# Patient Record
Sex: FEMALE | Race: WHITE | URBAN - METROPOLITAN AREA
[De-identification: names, ages, dates, MRNs, and addresses within clinical notes are randomized per-mention and may not be internally consistent; named-entity substitution may affect disease eponyms.]

---

## 2021-12-13 ENCOUNTER — OFFICE VISIT (OUTPATIENT)
Dept: FAMILY MEDICINE CLINIC | Facility: CLINIC | Age: 46
End: 2021-12-13
Payer: COMMERCIAL

## 2021-12-13 VITALS
SYSTOLIC BLOOD PRESSURE: 152 MMHG | BODY MASS INDEX: 23.63 KG/M2 | WEIGHT: 147 LBS | TEMPERATURE: 98 F | HEIGHT: 66 IN | DIASTOLIC BLOOD PRESSURE: 89 MMHG | HEART RATE: 86 BPM

## 2021-12-13 DIAGNOSIS — Z12.31 ENCOUNTER FOR SCREENING MAMMOGRAM FOR MALIGNANT NEOPLASM OF BREAST: ICD-10-CM

## 2021-12-13 DIAGNOSIS — E61.1 IRON DEFICIENCY: ICD-10-CM

## 2021-12-13 DIAGNOSIS — N80.1 ENDOMETRIOMA OF OVARY: ICD-10-CM

## 2021-12-13 DIAGNOSIS — Z00.00 ENCOUNTER FOR ANNUAL HEALTH EXAMINATION: Primary | ICD-10-CM

## 2021-12-13 DIAGNOSIS — R03.0 ELEVATED BLOOD PRESSURE READING: ICD-10-CM

## 2021-12-13 PROCEDURE — 3077F SYST BP >= 140 MM HG: CPT | Performed by: FAMILY MEDICINE

## 2021-12-13 PROCEDURE — 99386 PREV VISIT NEW AGE 40-64: CPT | Performed by: FAMILY MEDICINE

## 2021-12-13 PROCEDURE — 3079F DIAST BP 80-89 MM HG: CPT | Performed by: FAMILY MEDICINE

## 2021-12-13 PROCEDURE — 3008F BODY MASS INDEX DOCD: CPT | Performed by: FAMILY MEDICINE

## 2021-12-13 NOTE — PROGRESS NOTES
HPI:   Diedra Kayser is a 39year old female who presents for a complete physical exam.    Work: Works in a warehouse  Social: Lives with her son   Diet: eats home cooked meals   Exercise: Sedentary  GYN history: , one miscarriage  Periods are: s (Approximate)   BMI 23.73 kg/m²     GENERAL: well developed, well nourished, in no apparent distress  SKIN: no rashes, no suspicious lesions  HEENT: atraumatic, normocephalic, ears are clear  EYES: PERRLA, EOMI,conjunctiva are clear  NECK: supple, no adeno

## 2022-01-07 ENCOUNTER — TELEPHONE (OUTPATIENT)
Dept: OBGYN CLINIC | Facility: CLINIC | Age: 47
End: 2022-01-07

## 2022-01-07 ENCOUNTER — OFFICE VISIT (OUTPATIENT)
Dept: OBGYN CLINIC | Facility: CLINIC | Age: 47
End: 2022-01-07
Payer: COMMERCIAL

## 2022-01-07 VITALS — DIASTOLIC BLOOD PRESSURE: 62 MMHG | SYSTOLIC BLOOD PRESSURE: 122 MMHG | WEIGHT: 147 LBS | BODY MASS INDEX: 24 KG/M2

## 2022-01-07 DIAGNOSIS — N92.6 IRREGULAR PERIODS: ICD-10-CM

## 2022-01-07 DIAGNOSIS — Z01.419 WELL WOMAN EXAM WITH ROUTINE GYNECOLOGICAL EXAM: Primary | ICD-10-CM

## 2022-01-07 DIAGNOSIS — Z11.3 SCREENING EXAMINATION FOR STD (SEXUALLY TRANSMITTED DISEASE): ICD-10-CM

## 2022-01-07 PROCEDURE — 3074F SYST BP LT 130 MM HG: CPT | Performed by: OBSTETRICS & GYNECOLOGY

## 2022-01-07 PROCEDURE — 99386 PREV VISIT NEW AGE 40-64: CPT | Performed by: OBSTETRICS & GYNECOLOGY

## 2022-01-07 PROCEDURE — 3078F DIAST BP <80 MM HG: CPT | Performed by: OBSTETRICS & GYNECOLOGY

## 2022-01-07 NOTE — PROGRESS NOTES
HPI:    Patient ID: Brooke Yoo is a 55year old year old female.     HPI  New patient  Well woman visit  States that her last woman's exam was over 5 years ago which includes her last Pap test and mammogram.  She has a history of endometriosis and tenderness. There is no rebound and no CVA tenderness. No hernia. Hernia negative in the ventral area,  negative in the right inguinal area and negative in the left inguinal area.      Genitourinary:   Pelvic exam was performed with patient supine and chape CASCADE,         CHLAMYDIA/GONOCOCCUS, IRIS, HIV AG AB COMBO              Orders Placed This Encounter      [facility]          Standing Status: Future          Standing Expiration Date: 1/7/2023          Order Specific Question: Release to patient          Answer

## 2022-01-10 ENCOUNTER — TELEPHONE (OUTPATIENT)
Dept: FAMILY MEDICINE CLINIC | Facility: CLINIC | Age: 47
End: 2022-01-10

## 2022-01-10 DIAGNOSIS — D50.9 MICROCYTIC ANEMIA: Primary | ICD-10-CM

## 2022-01-10 PROBLEM — E61.1 IRON DEFICIENCY: Status: ACTIVE | Noted: 2022-01-10

## 2022-01-10 LAB
C TRACH DNA SPEC QL NAA+PROBE: NEGATIVE
HPV I/H RISK 1 DNA SPEC QL NAA+PROBE: NEGATIVE
N GONORRHOEA DNA SPEC QL NAA+PROBE: NEGATIVE

## 2022-01-10 RX ORDER — FERROUS SULFATE TAB EC 324 MG (65 MG FE EQUIVALENT) 324 (65 FE) MG
1 TABLET DELAYED RESPONSE ORAL DAILY
Qty: 90 TABLET | Refills: 0 | Status: SHIPPED | OUTPATIENT
Start: 2022-01-10 | End: 2022-02-09

## 2022-01-14 ENCOUNTER — TELEPHONE (OUTPATIENT)
Dept: OBGYN CLINIC | Facility: CLINIC | Age: 47
End: 2022-01-14

## 2022-01-14 NOTE — TELEPHONE ENCOUNTER
[employer] SYSTEM[employer] message sent to pt.      ----- Message from Shaquille Christensen MD sent at 1/14/2022 10:39 AM CST -----  Please notify by MyChart or letter of normal Pap test and normal HPV cotesting.

## 2022-01-17 ENCOUNTER — TELEPHONE (OUTPATIENT)
Dept: OBGYN CLINIC | Facility: CLINIC | Age: 47
End: 2022-01-17

## 2022-01-17 NOTE — TELEPHONE ENCOUNTER
Please inform patient of her blood tests which show that she is not menopausal and has normal thyroid hormone screening.   Screening for HIV, hepatitis B and C were normal.

## 2022-02-15 ENCOUNTER — TELEPHONE (OUTPATIENT)
Dept: OBGYN CLINIC | Facility: CLINIC | Age: 47
End: 2022-02-15

## 2022-02-18 ENCOUNTER — TELEPHONE (OUTPATIENT)
Dept: OBGYN CLINIC | Facility: CLINIC | Age: 47
End: 2022-02-18

## 2022-02-18 NOTE — TELEPHONE ENCOUNTER
Patient states she received a message to let her know she had some pending lab work. Patient would like to speak to a nurse stating she thinks she did all the lab work necessary. Patient is a Kyrgyz speaker.

## 2022-02-18 NOTE — TELEPHONE ENCOUNTER
Called [facility] and they will be faxing all the test results pt had done on 1/8/22 to office. Staff to look for fax.

## 2022-02-18 NOTE — TELEPHONE ENCOUNTER
Pt informed that she needs to complete Exams for [facility] that are still in the system. Pt thought she had completed them already. This RN checked [facility] Plascencia and nothing seen in the sytem. Pt will go to [facility] to ask if her labs were completed if not, she will get them done when she goes. No further questions.

## 2022-02-18 NOTE — TELEPHONE ENCOUNTER
Pt said labs were done at [address] [place] [address] Jan 8. [facility] said they sent results to us  Belizean speaking

## 2022-02-24 ENCOUNTER — TELEPHONE (OUTPATIENT)
Dept: OBGYN CLINIC | Facility: CLINIC | Age: 47
End: 2022-02-24

## 2022-02-24 NOTE — TELEPHONE ENCOUNTER
Please inform of the blood test results that were faxed from [facility]. Results show normal thyroid screen and FSH. This indicates that she is not menopausal as yet. Screening for STDs including HIV, hepatitis B and C were negative.

## 2022-02-24 NOTE — TELEPHONE ENCOUNTER
Pt Name and  verified. Patient informed and verbalized understanding. Pt states that she did receive a bill but was unsure what it was for and she said she will be calling back with that information.

## 2022-03-02 ENCOUNTER — TELEPHONE (OUTPATIENT)
Dept: OBGYN CLINIC | Facility: CLINIC | Age: 47
End: 2022-03-02

## 2022-03-02 NOTE — TELEPHONE ENCOUNTER
Patient called in she stated Dr Belkis Griffith office called her two days ago,   She wasn't able to answer she stated she is now able to return the call . .. She is available after 2:30 every day . Stephanie Swan ...  NEEDS INTEPRETER

## 2022-03-03 NOTE — TELEPHONE ENCOUNTER
Left detailed message informing her that office staff here at [facility] did not contact her today. Advised that it may have possibly been an automated call as she does have an apt scheduled for mammogram on 3/9/22. Pt to call back if she has any questions/concerns.

## 2022-03-09 ENCOUNTER — HOSPITAL ENCOUNTER (OUTPATIENT)
Dept: MAMMOGRAPHY | Facility: HOSPITAL | Age: 47
Discharge: HOME | End: 2022-03-09
Attending: FAMILY MEDICINE
Payer: COMMERCIAL

## 2022-03-09 DIAGNOSIS — Z12.31 ENCOUNTER FOR SCREENING MAMMOGRAM FOR MALIGNANT NEOPLASM OF BREAST: ICD-10-CM

## 2022-03-09 PROCEDURE — 77063 BREAST TOMOSYNTHESIS BI: CPT | Performed by: FAMILY MEDICINE

## 2022-03-09 PROCEDURE — 77067 SCR MAMMO BI INCL CAD: CPT | Performed by: FAMILY MEDICINE

## 2022-03-15 ENCOUNTER — HOSPITAL ENCOUNTER (OUTPATIENT)
Dept: MAMMOGRAPHY | Facility: HOSPITAL | Age: 47
Discharge: HOME | End: 2022-03-15
Attending: FAMILY MEDICINE
Payer: COMMERCIAL

## 2022-03-15 DIAGNOSIS — R92.8 ABNORMAL MAMMOGRAM: ICD-10-CM

## 2022-03-15 PROCEDURE — 77061 BREAST TOMOSYNTHESIS UNI: CPT | Performed by: FAMILY MEDICINE

## 2022-03-15 PROCEDURE — 77065 DX MAMMO INCL CAD UNI: CPT | Performed by: FAMILY MEDICINE

## 2023-03-02 ENCOUNTER — OFFICE VISIT (OUTPATIENT)
Dept: FAMILY MEDICINE CLINIC | Facility: CLINIC | Age: 48
End: 2023-03-02

## 2023-03-02 VITALS
WEIGHT: 159 LBS | BODY MASS INDEX: 26 KG/M2 | DIASTOLIC BLOOD PRESSURE: 103 MMHG | HEART RATE: 76 BPM | TEMPERATURE: 99 F | SYSTOLIC BLOOD PRESSURE: 165 MMHG

## 2023-03-02 DIAGNOSIS — Z12.11 COLON CANCER SCREENING: ICD-10-CM

## 2023-03-02 DIAGNOSIS — Z00.00 ENCOUNTER FOR ANNUAL HEALTH EXAMINATION: Primary | ICD-10-CM

## 2023-03-02 DIAGNOSIS — D22.9 CHANGE IN MOLE: ICD-10-CM

## 2023-03-02 DIAGNOSIS — T78.40XA ALLERGY, INITIAL ENCOUNTER: ICD-10-CM

## 2023-03-02 DIAGNOSIS — Z12.31 ENCOUNTER FOR SCREENING MAMMOGRAM FOR MALIGNANT NEOPLASM OF BREAST: ICD-10-CM

## 2023-03-02 DIAGNOSIS — I10 ESSENTIAL HYPERTENSION: ICD-10-CM

## 2023-03-02 DIAGNOSIS — Z12.4 CERVICAL CANCER SCREENING: ICD-10-CM

## 2023-03-02 PROCEDURE — 99396 PREV VISIT EST AGE 40-64: CPT | Performed by: FAMILY MEDICINE

## 2023-03-02 PROCEDURE — 3077F SYST BP >= 140 MM HG: CPT | Performed by: FAMILY MEDICINE

## 2023-03-02 PROCEDURE — 3080F DIAST BP >= 90 MM HG: CPT | Performed by: FAMILY MEDICINE

## 2023-03-02 RX ORDER — TELMISARTAN 40 MG/1
40 TABLET ORAL DAILY
Qty: 90 TABLET | Refills: 1 | Status: SHIPPED | OUTPATIENT
Start: 2023-03-02

## 2023-03-08 ENCOUNTER — PATIENT MESSAGE (OUTPATIENT)
Dept: FAMILY MEDICINE CLINIC | Facility: CLINIC | Age: 48
End: 2023-03-08

## 2023-03-09 ENCOUNTER — HOSPITAL ENCOUNTER (OUTPATIENT)
Age: 48
Discharge: HOME | End: 2023-03-09
Attending: EMERGENCY MEDICINE
Payer: COMMERCIAL

## 2023-03-09 ENCOUNTER — APPOINTMENT (OUTPATIENT)
Dept: GENERAL RADIOLOGY | Age: 48
End: 2023-03-09
Attending: EMERGENCY MEDICINE
Payer: COMMERCIAL

## 2023-03-09 ENCOUNTER — APPOINTMENT (OUTPATIENT)
Dept: CT IMAGING | Age: 48
End: 2023-03-09
Attending: EMERGENCY MEDICINE
Payer: COMMERCIAL

## 2023-03-09 VITALS
HEART RATE: 105 BPM | DIASTOLIC BLOOD PRESSURE: 95 MMHG | RESPIRATION RATE: 20 BRPM | SYSTOLIC BLOOD PRESSURE: 172 MMHG | OXYGEN SATURATION: 95 % | TEMPERATURE: 99 F

## 2023-03-09 DIAGNOSIS — J18.9 PNEUMONIA DUE TO INFECTIOUS ORGANISM, UNSPECIFIED LATERALITY, UNSPECIFIED PART OF LUNG: ICD-10-CM

## 2023-03-09 DIAGNOSIS — R03.0 ELEVATED BLOOD PRESSURE READING: ICD-10-CM

## 2023-03-09 DIAGNOSIS — R06.2 WHEEZING: Primary | ICD-10-CM

## 2023-03-09 DIAGNOSIS — R91.1 LUNG NODULE: ICD-10-CM

## 2023-03-09 LAB
#MXD IC: 0.9 X10ˆ3/UL (ref 0.1–1)
ATRIAL RATE: 100 BPM
BUN BLD-MCNC: 5 MG/DL (ref 7–18)
CHLORIDE BLD-SCNC: 104 MMOL/L (ref 98–112)
CO2 BLD-SCNC: 24 MMOL/L (ref 21–32)
CREAT BLD-MCNC: 0.6 MG/DL
DDIMER WHOLE BLOOD: 1560 NG/ML DDU (ref ?–400)
GFR SERPLBLD BASED ON 1.73 SQ M-ARVRAT: 111 ML/MIN/1.73M2 (ref 60–?)
GLUCOSE BLD-MCNC: 120 MG/DL (ref 70–99)
HCT VFR BLD AUTO: 39.4 %
HCT VFR BLD CALC: 41 %
HGB BLD-MCNC: 12.8 G/DL
ISTAT IONIZED CALCIUM FOR CHEM 8: 1.12 MMOL/L (ref 1.12–1.32)
LYMPHOCYTES # BLD AUTO: 1.5 X10ˆ3/UL (ref 1–4)
LYMPHOCYTES NFR BLD AUTO: 14.8 %
MCH RBC QN AUTO: 26.6 PG (ref 26–34)
MCHC RBC AUTO-ENTMCNC: 32.5 G/DL (ref 31–37)
MCV RBC AUTO: 81.7 FL (ref 80–100)
MIXED CELL %: 8.7 %
NEUTROPHILS # BLD AUTO: 8 X10ˆ3/UL (ref 1.5–7.7)
NEUTROPHILS NFR BLD AUTO: 76.5 %
P AXIS: 11 DEGREES
P-R INTERVAL: 120 MS
PLATELET # BLD AUTO: 204 X10ˆ3/UL (ref 150–450)
POCT INFLUENZA A: NEGATIVE
POCT INFLUENZA B: NEGATIVE
POTASSIUM BLD-SCNC: 3.3 MMOL/L (ref 3.6–5.1)
Q-T INTERVAL: 342 MS
QRS DURATION: 84 MS
QTC CALCULATION (BEZET): 441 MS
R AXIS: 74 DEGREES
RBC # BLD AUTO: 4.82 X10ˆ6/UL
SARS-COV-2 RNA RESP QL NAA+PROBE: NOT DETECTED
SODIUM BLD-SCNC: 140 MMOL/L (ref 136–145)
T AXIS: 62 DEGREES
TROPONIN I BLD-MCNC: <0.02 NG/ML
TROPONIN I BLD-MCNC: <0.02 NG/ML
VENTRICULAR RATE: 100 BPM
WBC # BLD AUTO: 10.4 X10ˆ3/UL (ref 4–11)

## 2023-03-09 PROCEDURE — 84484 ASSAY OF TROPONIN QUANT: CPT

## 2023-03-09 PROCEDURE — 71260 CT THORAX DX C+: CPT | Performed by: EMERGENCY MEDICINE

## 2023-03-09 PROCEDURE — 93010 ELECTROCARDIOGRAM REPORT: CPT

## 2023-03-09 PROCEDURE — 99215 OFFICE O/P EST HI 40 MIN: CPT

## 2023-03-09 PROCEDURE — 36415 COLL VENOUS BLD VENIPUNCTURE: CPT

## 2023-03-09 PROCEDURE — 85378 FIBRIN DEGRADE SEMIQUANT: CPT | Performed by: EMERGENCY MEDICINE

## 2023-03-09 PROCEDURE — 85025 COMPLETE CBC W/AUTO DIFF WBC: CPT | Performed by: EMERGENCY MEDICINE

## 2023-03-09 PROCEDURE — 87502 INFLUENZA DNA AMP PROBE: CPT | Performed by: EMERGENCY MEDICINE

## 2023-03-09 PROCEDURE — 80047 BASIC METABLC PNL IONIZED CA: CPT

## 2023-03-09 PROCEDURE — 99205 OFFICE O/P NEW HI 60 MIN: CPT

## 2023-03-09 PROCEDURE — 94640 AIRWAY INHALATION TREATMENT: CPT

## 2023-03-09 PROCEDURE — 93005 ELECTROCARDIOGRAM TRACING: CPT

## 2023-03-09 PROCEDURE — 71046 X-RAY EXAM CHEST 2 VIEWS: CPT | Performed by: EMERGENCY MEDICINE

## 2023-03-09 RX ORDER — DOXYCYCLINE HYCLATE 100 MG/1
100 CAPSULE ORAL 2 TIMES DAILY
Qty: 14 CAPSULE | Refills: 0 | Status: SHIPPED | OUTPATIENT
Start: 2023-03-09 | End: 2023-03-16

## 2023-03-09 RX ORDER — ACETAMINOPHEN 500 MG
1000 TABLET ORAL ONCE
Status: COMPLETED | OUTPATIENT
Start: 2023-03-09 | End: 2023-03-09

## 2023-03-09 RX ORDER — ALBUTEROL SULFATE 2.5 MG/3ML
2.5 SOLUTION RESPIRATORY (INHALATION) ONCE
Status: COMPLETED | OUTPATIENT
Start: 2023-03-09 | End: 2023-03-09

## 2023-03-09 RX ORDER — AMLODIPINE BESYLATE 5 MG/1
5 TABLET ORAL DAILY
Qty: 30 TABLET | Refills: 0 | Status: SHIPPED | OUTPATIENT
Start: 2023-03-09 | End: 2023-03-09

## 2023-03-09 RX ORDER — POTASSIUM CHLORIDE 20 MEQ/1
40 TABLET, EXTENDED RELEASE ORAL ONCE
Status: COMPLETED | OUTPATIENT
Start: 2023-03-09 | End: 2023-03-09

## 2023-03-09 RX ORDER — ALBUTEROL SULFATE 90 UG/1
2 AEROSOL, METERED RESPIRATORY (INHALATION) EVERY 4 HOURS PRN
Qty: 1 EACH | Refills: 0 | Status: SHIPPED | OUTPATIENT
Start: 2023-03-09 | End: 2023-03-09

## 2023-03-09 RX ORDER — ALBUTEROL SULFATE 90 UG/1
2 AEROSOL, METERED RESPIRATORY (INHALATION) EVERY 4 HOURS PRN
Qty: 1 EACH | Refills: 0 | Status: SHIPPED | OUTPATIENT
Start: 2023-03-09 | End: 2023-04-08

## 2023-03-09 RX ORDER — DOXYCYCLINE HYCLATE 100 MG/1
100 CAPSULE ORAL 2 TIMES DAILY
Qty: 14 CAPSULE | Refills: 0 | Status: SHIPPED | OUTPATIENT
Start: 2023-03-09 | End: 2023-03-09

## 2023-03-09 RX ORDER — AMLODIPINE BESYLATE 5 MG/1
5 TABLET ORAL DAILY
Qty: 30 TABLET | Refills: 0 | Status: SHIPPED | OUTPATIENT
Start: 2023-03-09 | End: 2023-03-17

## 2023-03-09 NOTE — DISCHARGE INSTRUCTIONS
Thank you for visiting our immediate care for your health care needs. Please follow up with your regular doctor in the next 1-2 days. If you have any additional problems please return to the immediate care. Please take all medications as prescribed.

## 2023-03-10 LAB
ATRIAL RATE: 89 BPM
P AXIS: 33 DEGREES
P-R INTERVAL: 130 MS
Q-T INTERVAL: 372 MS
QRS DURATION: 86 MS
QTC CALCULATION (BEZET): 452 MS
R AXIS: 69 DEGREES
T AXIS: 70 DEGREES
VENTRICULAR RATE: 89 BPM

## 2023-03-14 ENCOUNTER — LAB ENCOUNTER (OUTPATIENT)
Dept: LAB | Age: 48
End: 2023-03-14
Attending: FAMILY MEDICINE
Payer: COMMERCIAL

## 2023-03-14 LAB
ALBUMIN SERPL-MCNC: 3.8 G/DL (ref 3.4–5)
ALBUMIN/GLOB SERPL: 1 {RATIO} (ref 1–2)
ALP LIVER SERPL-CCNC: 102 U/L
ALT SERPL-CCNC: 20 U/L
ANION GAP SERPL CALC-SCNC: 8 MMOL/L (ref 0–18)
AST SERPL-CCNC: 11 U/L (ref 15–37)
BASOPHILS # BLD AUTO: 0.04 X10(3) UL (ref 0–0.2)
BASOPHILS NFR BLD AUTO: 0.5 %
BILIRUB SERPL-MCNC: 0.5 MG/DL (ref 0.1–2)
BUN BLD-MCNC: 10 MG/DL (ref 7–18)
BUN/CREAT SERPL: 13.2 (ref 10–20)
CALCIUM BLD-MCNC: 8.6 MG/DL (ref 8.5–10.1)
CHLORIDE SERPL-SCNC: 108 MMOL/L (ref 98–112)
CHOLEST SERPL-MCNC: 148 MG/DL (ref ?–200)
CO2 SERPL-SCNC: 26 MMOL/L (ref 21–32)
CREAT BLD-MCNC: 0.76 MG/DL
DEPRECATED RDW RBC AUTO: 43.8 FL (ref 35.1–46.3)
EOSINOPHIL # BLD AUTO: 0.42 X10(3) UL (ref 0–0.7)
EOSINOPHIL NFR BLD AUTO: 5.5 %
ERYTHROCYTE [DISTWIDTH] IN BLOOD BY AUTOMATED COUNT: 14.6 % (ref 11–15)
EST. AVERAGE GLUCOSE BLD GHB EST-MCNC: 123 MG/DL (ref 68–126)
FASTING PATIENT LIPID ANSWER: YES
FASTING STATUS PATIENT QL REPORTED: YES
GFR SERPLBLD BASED ON 1.73 SQ M-ARVRAT: 97 ML/MIN/1.73M2 (ref 60–?)
GLOBULIN PLAS-MCNC: 4 G/DL (ref 2.8–4.4)
GLUCOSE BLD-MCNC: 119 MG/DL (ref 70–99)
HBA1C MFR BLD: 5.9 % (ref ?–5.7)
HCT VFR BLD AUTO: 41.2 %
HDLC SERPL-MCNC: 54 MG/DL (ref 40–59)
HGB BLD-MCNC: 13.3 G/DL
IMM GRANULOCYTES # BLD AUTO: 0.01 X10(3) UL (ref 0–1)
IMM GRANULOCYTES NFR BLD: 0.1 %
LDLC SERPL CALC-MCNC: 76 MG/DL (ref ?–100)
LYMPHOCYTES # BLD AUTO: 2.75 X10(3) UL (ref 1–4)
LYMPHOCYTES NFR BLD AUTO: 36.3 %
MCH RBC QN AUTO: 26.7 PG (ref 26–34)
MCHC RBC AUTO-ENTMCNC: 32.3 G/DL (ref 31–37)
MCV RBC AUTO: 82.7 FL
MONOCYTES # BLD AUTO: 0.64 X10(3) UL (ref 0.1–1)
MONOCYTES NFR BLD AUTO: 8.5 %
NEUTROPHILS # BLD AUTO: 3.71 X10 (3) UL (ref 1.5–7.7)
NEUTROPHILS # BLD AUTO: 3.71 X10(3) UL (ref 1.5–7.7)
NEUTROPHILS NFR BLD AUTO: 49.1 %
NONHDLC SERPL-MCNC: 94 MG/DL (ref ?–130)
OSMOLALITY SERPL CALC.SUM OF ELEC: 294 MOSM/KG (ref 275–295)
PLATELET # BLD AUTO: 256 10(3)UL (ref 150–450)
POTASSIUM SERPL-SCNC: 3.5 MMOL/L (ref 3.5–5.1)
PROT SERPL-MCNC: 7.8 G/DL (ref 6.4–8.2)
RBC # BLD AUTO: 4.98 X10(6)UL
SODIUM SERPL-SCNC: 142 MMOL/L (ref 136–145)
TRIGL SERPL-MCNC: 97 MG/DL (ref 30–149)
TSI SER-ACNC: 3.72 MIU/ML (ref 0.36–3.74)
VLDLC SERPL CALC-MCNC: 15 MG/DL (ref 0–30)
WBC # BLD AUTO: 7.6 X10(3) UL (ref 4–11)

## 2023-03-14 PROCEDURE — 80061 LIPID PANEL: CPT | Performed by: FAMILY MEDICINE

## 2023-03-14 PROCEDURE — 36415 COLL VENOUS BLD VENIPUNCTURE: CPT | Performed by: FAMILY MEDICINE

## 2023-03-14 PROCEDURE — 84443 ASSAY THYROID STIM HORMONE: CPT | Performed by: FAMILY MEDICINE

## 2023-03-14 PROCEDURE — 85025 COMPLETE CBC W/AUTO DIFF WBC: CPT | Performed by: FAMILY MEDICINE

## 2023-03-14 PROCEDURE — 82785 ASSAY OF IGE: CPT | Performed by: FAMILY MEDICINE

## 2023-03-14 PROCEDURE — 83036 HEMOGLOBIN GLYCOSYLATED A1C: CPT | Performed by: FAMILY MEDICINE

## 2023-03-14 PROCEDURE — 86003 ALLG SPEC IGE CRUDE XTRC EA: CPT | Performed by: FAMILY MEDICINE

## 2023-03-14 PROCEDURE — 80053 COMPREHEN METABOLIC PANEL: CPT | Performed by: FAMILY MEDICINE

## 2023-03-16 LAB
A ALTERNATA IGE QN: <0.1 KUA/L (ref ?–0.1)
A FUMIGATUS IGE QN: <0.1 KUA/L (ref ?–0.1)
AMER SYCAMORE IGE QN: <0.1 KUA/L (ref ?–0.1)
BERMUDA GRASS IGE QN: <0.1 KUA/L (ref ?–0.1)
BOXELDER IGE QN: <0.1 KUA/L (ref ?–0.1)
C HERBARUM IGE QN: <0.1 KUA/L (ref ?–0.1)
CALIF WALNUT IGE QN: <0.1 KUA/L (ref ?–0.1)
CAT DANDER IGE QN: 44.7 KUA/L (ref ?–0.1)
CMN PIGWEED IGE QN: <0.1 KUA/L (ref ?–0.1)
COMMON RAGWEED IGE QN: <0.1 KUA/L (ref ?–0.1)
COTTONWOOD IGE QN: <0.1 KUA/L (ref ?–0.1)
D FARINAE IGE QN: 0.42 KUA/L (ref ?–0.1)
D PTERONYSS IGE QN: 0.46 KUA/L (ref ?–0.1)
DOG DANDER IGE QN: 2.58 KUA/L (ref ?–0.1)
IGE SERPL-ACNC: 237 KU/L (ref 2–214)
M RACEMOSUS IGE QN: <0.1 KUA/L (ref ?–0.1)
MARSH ELDER IGE QN: <0.1 KUA/L (ref ?–0.1)
MOUSE EPITH IGE QN: <0.1 KUA/L (ref ?–0.1)
MT JUNIPER IGE QN: <0.1 KUA/L (ref ?–0.1)
P NOTATUM IGE QN: <0.1 KUA/L (ref ?–0.1)
PECAN/HICK TREE IGE QN: <0.1 KUA/L (ref ?–0.1)
ROACH IGE QN: <0.1 KUA/L (ref ?–0.1)
SALTWORT IGE QN: <0.1 KUA/L (ref ?–0.1)
TIMOTHY IGE QN: <0.1 KUA/L (ref ?–0.1)
WHITE ASH IGE QN: <0.1 KUA/L (ref ?–0.1)
WHITE ELM IGE QN: <0.1 KUA/L (ref ?–0.1)
WHITE MULBERRY IGE QN: <0.1 KUA/L (ref ?–0.1)
WHITE OAK IGE QN: <0.1 KUA/L (ref ?–0.1)

## 2023-03-17 ENCOUNTER — PATIENT MESSAGE (OUTPATIENT)
Dept: FAMILY MEDICINE CLINIC | Facility: CLINIC | Age: 48
End: 2023-03-17

## 2023-03-17 ENCOUNTER — TELEMEDICINE (OUTPATIENT)
Dept: FAMILY MEDICINE CLINIC | Facility: CLINIC | Age: 48
End: 2023-03-17

## 2023-03-17 DIAGNOSIS — J30.81 CAT ALLERGIES: ICD-10-CM

## 2023-03-17 DIAGNOSIS — Z12.4 CERVICAL CANCER SCREENING: ICD-10-CM

## 2023-03-17 DIAGNOSIS — R93.89 ABNORMAL CT OF THE CHEST: Primary | ICD-10-CM

## 2023-03-17 DIAGNOSIS — R91.8 GROUND GLASS OPACITY PRESENT ON IMAGING OF LUNG: ICD-10-CM

## 2023-03-17 DIAGNOSIS — I10 ESSENTIAL HYPERTENSION: ICD-10-CM

## 2023-03-17 DIAGNOSIS — R91.1 PULMONARY NODULE: ICD-10-CM

## 2023-03-17 DIAGNOSIS — R73.03 PREDIABETES: ICD-10-CM

## 2023-03-17 PROCEDURE — 99214 OFFICE O/P EST MOD 30 MIN: CPT | Performed by: FAMILY MEDICINE

## 2023-03-17 RX ORDER — AMLODIPINE BESYLATE 5 MG/1
5 TABLET ORAL DAILY
Qty: 90 TABLET | Refills: 3 | Status: SHIPPED | OUTPATIENT
Start: 2023-03-17

## 2023-03-17 RX ORDER — TELMISARTAN 40 MG/1
40 TABLET ORAL DAILY
Qty: 90 TABLET | Refills: 1 | Status: SHIPPED | OUTPATIENT
Start: 2023-03-17

## 2023-03-17 NOTE — PROGRESS NOTES
Virtual Telephone Check-In    Sofia Smith verbally consents to a Virtual/Telephone Check-In service on 03/17/23. Patient understands and accepts financial responsibility for any deductible, co-insurance and/or co-pays associated with this service. Duration of the service: 15 minutes  This telemedicine visit was conducted using live audio and video. Summary of topics discussed:     Lab work follow up. Newly discovered pre-dm. HTN - taking amlodipine rx'ed in ER last week but not checking BP at home. Was not able to  ARB rxed in office. Was seen in ER for SOB, given albuterol which helped. CXR and CT PE done - 1 cm nodule seen. Pt has a history of endometrioma - wants to est care with OBG. Has allergies to cat/dog dander and needs f/u with allergist. Has 2 cats at home. Physical Exam:  General: alert, speaking in full sentences, no acute distress  Lungs: respirations sound unlabored, no audible wheezing with speaking. Neurologic: alert, oriented x3    Assessment and plan:    1. Essential hypertension  - pt had a hard time picking up telmisartan needs pharmacy transfer. Amlodipine strated in ER. Start checking BP at home and send ua s aBP log next week. Can start w just amlodipine and will add on telmisartan as appropriate. - telmisartan 40 MG Oral Tab; Take 1 tablet (40 mg total) by mouth daily. Dispense: 90 tablet; Refill: 1  - amLODIPine 5 MG Oral Tab; Take 1 tablet (5 mg total) by mouth daily. Dispense: 90 tablet; Refill: 3    2. Prediabetes  - new diagnosis. Diet and l/s changes     3. Cat allergies  - has cats, allergy testing reviewed   - ALLERGY - INTERNAL    4. Pulmonary nodule  nonemergent PET-CT is suggested - Will defer to next OV.   - CT CHEST (CPT=71250); Future    5. Ground glass opacity present on imaging of lung  - CT PE reviewed, recommend re-test in 3 months. - PULMONARY - INTERNAL    6.  Abnormal CT of the chest  - PULMONARY - INTERNAL  - CT CHEST (CPT=71250); Future    7. Cervical cancer screening  - pt requesting blank referral, can update referral prn with specialist name if needed. - OBG - INTERNAL      Advised to call back clinic if no improvement in symptoms. Red flags discussed to go to ER.      Swetha Fisher MD

## 2023-03-23 ENCOUNTER — OFFICE VISIT (OUTPATIENT)
Dept: DERMATOLOGY CLINIC | Facility: CLINIC | Age: 48
End: 2023-03-23

## 2023-03-23 DIAGNOSIS — D22.9 MULTIPLE BENIGN NEVI: Primary | ICD-10-CM

## 2023-03-23 PROCEDURE — 99203 OFFICE O/P NEW LOW 30 MIN: CPT | Performed by: STUDENT IN AN ORGANIZED HEALTH CARE EDUCATION/TRAINING PROGRAM

## 2023-03-29 RX ORDER — AMLODIPINE BESYLATE 5 MG/1
5 TABLET ORAL DAILY
Qty: 90 TABLET | Refills: 3 | Status: SHIPPED | OUTPATIENT
Start: 2023-03-29

## 2023-03-31 ENCOUNTER — HOSPITAL ENCOUNTER (OUTPATIENT)
Dept: MAMMOGRAPHY | Age: 48
Discharge: HOME | End: 2023-03-31
Attending: FAMILY MEDICINE
Payer: COMMERCIAL

## 2023-03-31 DIAGNOSIS — Z12.31 ENCOUNTER FOR SCREENING MAMMOGRAM FOR MALIGNANT NEOPLASM OF BREAST: ICD-10-CM

## 2023-03-31 PROCEDURE — 77067 SCR MAMMO BI INCL CAD: CPT | Performed by: FAMILY MEDICINE

## 2023-03-31 PROCEDURE — 77063 BREAST TOMOSYNTHESIS BI: CPT | Performed by: FAMILY MEDICINE

## 2023-05-02 ENCOUNTER — NURSE ONLY (OUTPATIENT)
Dept: ALLERGY | Facility: CLINIC | Age: 48
End: 2023-05-02

## 2023-05-02 ENCOUNTER — OFFICE VISIT (OUTPATIENT)
Dept: ALLERGY | Facility: CLINIC | Age: 48
End: 2023-05-02

## 2023-05-02 VITALS
TEMPERATURE: 99 F | SYSTOLIC BLOOD PRESSURE: 129 MMHG | OXYGEN SATURATION: 99 % | HEART RATE: 99 BPM | DIASTOLIC BLOOD PRESSURE: 87 MMHG | RESPIRATION RATE: 20 BRPM

## 2023-05-02 DIAGNOSIS — Z92.29 COVID-19 VACCINE SERIES COMPLETED: ICD-10-CM

## 2023-05-02 DIAGNOSIS — J30.2 SEASONAL AND PERENNIAL ALLERGIC RHINOCONJUNCTIVITIS: ICD-10-CM

## 2023-05-02 DIAGNOSIS — H10.10 SEASONAL AND PERENNIAL ALLERGIC RHINOCONJUNCTIVITIS: Primary | ICD-10-CM

## 2023-05-02 DIAGNOSIS — Z23 FLU VACCINE NEED: ICD-10-CM

## 2023-05-02 DIAGNOSIS — J45.20 MILD INTERMITTENT REACTIVE AIRWAY DISEASE WITHOUT COMPLICATION: ICD-10-CM

## 2023-05-02 DIAGNOSIS — J30.2 SEASONAL AND PERENNIAL ALLERGIC RHINOCONJUNCTIVITIS: Primary | ICD-10-CM

## 2023-05-02 DIAGNOSIS — J30.89 SEASONAL AND PERENNIAL ALLERGIC RHINOCONJUNCTIVITIS: ICD-10-CM

## 2023-05-02 DIAGNOSIS — H10.10 SEASONAL AND PERENNIAL ALLERGIC RHINOCONJUNCTIVITIS: ICD-10-CM

## 2023-05-02 DIAGNOSIS — J30.89 SEASONAL AND PERENNIAL ALLERGIC RHINOCONJUNCTIVITIS: Primary | ICD-10-CM

## 2023-05-02 PROCEDURE — 3074F SYST BP LT 130 MM HG: CPT | Performed by: ALLERGY & IMMUNOLOGY

## 2023-05-02 PROCEDURE — 3079F DIAST BP 80-89 MM HG: CPT | Performed by: ALLERGY & IMMUNOLOGY

## 2023-05-02 PROCEDURE — 99244 OFF/OP CNSLTJ NEW/EST MOD 40: CPT | Performed by: ALLERGY & IMMUNOLOGY

## 2023-05-02 PROCEDURE — 3008F BODY MASS INDEX DOCD: CPT | Performed by: ALLERGY & IMMUNOLOGY

## 2023-05-02 PROCEDURE — 95004 PERQ TESTS W/ALRGNC XTRCS: CPT | Performed by: ALLERGY & IMMUNOLOGY

## 2023-05-02 RX ORDER — LORATADINE 10 MG/1
10 TABLET ORAL DAILY
COMMUNITY
End: 2023-05-02

## 2023-05-02 RX ORDER — LEVOCETIRIZINE DIHYDROCHLORIDE 5 MG/1
5 TABLET, FILM COATED ORAL EVERY EVENING
Qty: 90 TABLET | Refills: 1 | Status: SHIPPED | OUTPATIENT
Start: 2023-05-02

## 2023-05-02 RX ORDER — MONTELUKAST SODIUM 10 MG/1
10 TABLET ORAL NIGHTLY
Qty: 90 TABLET | Refills: 1 | Status: SHIPPED | OUTPATIENT
Start: 2023-05-02

## 2023-05-02 RX ORDER — AZELASTINE HYDROCHLORIDE 0.5 MG/ML
1 SOLUTION/ DROPS OPHTHALMIC 2 TIMES DAILY
Qty: 1 EACH | Refills: 0 | Status: SHIPPED | OUTPATIENT
Start: 2023-05-02

## 2023-05-02 NOTE — PATIENT INSTRUCTIONS
1 allergic rhinitis  Reviewed avoidance measures and potential treatment option of immunotherapy  Recommend trial of Xyzal, levocetirizine 5 g once a day as a nonsedating antihistamine for symptoms of runny nose sneezing itchy watery eyes   Nasacort 2 sprays per nostril once a day if having prominent nasal congestion postnasal drip  Optivar eyedrops 1 drop twice a day as an antihistamine eyedrop. Please store infiltrated for cooling effect  Start Singulair 10 mg once a day    #2 COVID vaccines  Recommend booster    #3 flu vaccine in the fall    #4 reactive airway disease  Suspect allergic component. Current albuterol usage is more than 2 days/week. Start trial of Singulair, montelukast 10 mg once a day  Reviewed goals of treatment her asthma symptoms or albuterol usage 2 days/week or less  Will consider trial of inhaled corticosteroid if not improving with above recommendations                 Orders This Visit:  No orders of the defined types were placed in this encounter. Meds This Visit:  Requested Prescriptions     Signed Prescriptions Disp Refills    levocetirizine 5 MG Oral Tab 90 tablet 1     Sig: Take 1 tablet (5 mg total) by mouth every evening.    montelukast (SINGULAIR) 10 MG Oral Tab 90 tablet 1     Sig: Take 1 tablet (10 mg total) by mouth nightly. Azelastine HCl 0.05 % Ophthalmic Solution 1 each 0     Sig: Place 1 drop into both eyes 2 (two) times daily.

## 2023-05-25 RX ORDER — AZELASTINE HYDROCHLORIDE 0.5 MG/ML
SOLUTION/ DROPS OPHTHALMIC
Qty: 6 ML | Refills: 0 | Status: SHIPPED | OUTPATIENT
Start: 2023-05-25

## 2023-06-06 ENCOUNTER — OFFICE VISIT (OUTPATIENT)
Dept: OBGYN CLINIC | Facility: CLINIC | Age: 48
End: 2023-06-06

## 2023-06-06 VITALS — WEIGHT: 160.63 LBS | BODY MASS INDEX: 26 KG/M2 | DIASTOLIC BLOOD PRESSURE: 86 MMHG | SYSTOLIC BLOOD PRESSURE: 124 MMHG

## 2023-06-06 DIAGNOSIS — Z87.42 HISTORY OF ENDOMETRIOSIS: ICD-10-CM

## 2023-06-06 DIAGNOSIS — R10.2 PELVIC PAIN: ICD-10-CM

## 2023-06-06 DIAGNOSIS — Z01.419 ENCOUNTER FOR GYNECOLOGICAL EXAMINATION WITHOUT ABNORMAL FINDING: Primary | ICD-10-CM

## 2023-06-06 PROCEDURE — 99396 PREV VISIT EST AGE 40-64: CPT | Performed by: OBSTETRICS & GYNECOLOGY

## 2023-06-06 PROCEDURE — 3074F SYST BP LT 130 MM HG: CPT | Performed by: OBSTETRICS & GYNECOLOGY

## 2023-06-06 PROCEDURE — 3079F DIAST BP 80-89 MM HG: CPT | Performed by: OBSTETRICS & GYNECOLOGY

## 2023-06-06 PROCEDURE — 99213 OFFICE O/P EST LOW 20 MIN: CPT | Performed by: OBSTETRICS & GYNECOLOGY

## 2023-06-07 LAB — HPV I/H RISK 1 DNA SPEC QL NAA+PROBE: NEGATIVE

## 2023-06-28 RX ORDER — AZELASTINE HYDROCHLORIDE 0.5 MG/ML
SOLUTION/ DROPS OPHTHALMIC
Qty: 1 EACH | Refills: 2 | Status: SHIPPED | OUTPATIENT
Start: 2023-06-28

## 2023-06-30 ENCOUNTER — OFFICE VISIT (OUTPATIENT)
Dept: PULMONOLOGY | Facility: CLINIC | Age: 48
End: 2023-06-30

## 2023-06-30 VITALS
WEIGHT: 162 LBS | DIASTOLIC BLOOD PRESSURE: 79 MMHG | OXYGEN SATURATION: 99 % | BODY MASS INDEX: 26 KG/M2 | HEART RATE: 81 BPM | RESPIRATION RATE: 14 BRPM | SYSTOLIC BLOOD PRESSURE: 121 MMHG

## 2023-06-30 DIAGNOSIS — R91.8 LUNG NODULES: ICD-10-CM

## 2023-06-30 DIAGNOSIS — R05.3 CHRONIC COUGH: Primary | ICD-10-CM

## 2023-06-30 PROCEDURE — 3074F SYST BP LT 130 MM HG: CPT | Performed by: INTERNAL MEDICINE

## 2023-06-30 PROCEDURE — 3078F DIAST BP <80 MM HG: CPT | Performed by: INTERNAL MEDICINE

## 2023-06-30 PROCEDURE — 99244 OFF/OP CNSLTJ NEW/EST MOD 40: CPT | Performed by: INTERNAL MEDICINE

## 2023-06-30 RX ORDER — ALBUTEROL SULFATE 90 UG/1
2 AEROSOL, METERED RESPIRATORY (INHALATION) EVERY 6 HOURS PRN
Qty: 1 EACH | Refills: 3 | Status: SHIPPED | OUTPATIENT
Start: 2023-06-30

## 2023-08-01 ENCOUNTER — PATIENT MESSAGE (OUTPATIENT)
Dept: FAMILY MEDICINE CLINIC | Facility: CLINIC | Age: 48
End: 2023-08-01

## 2023-08-01 ENCOUNTER — OFFICE VISIT (OUTPATIENT)
Dept: SURGERY | Facility: CLINIC | Age: 48
End: 2023-08-01

## 2023-08-01 ENCOUNTER — TELEPHONE (OUTPATIENT)
Dept: OPHTHALMOLOGY | Facility: CLINIC | Age: 48
End: 2023-08-01

## 2023-08-01 VITALS — WEIGHT: 162 LBS | BODY MASS INDEX: 26 KG/M2

## 2023-08-01 DIAGNOSIS — N39.3 STRESS INCONTINENCE: Primary | ICD-10-CM

## 2023-08-01 LAB
BILIRUB UR QL: NEGATIVE
CLARITY UR: CLEAR
GLUCOSE UR-MCNC: NORMAL MG/DL
KETONES UR-MCNC: NEGATIVE MG/DL
LEUKOCYTE ESTERASE UR QL STRIP.AUTO: NEGATIVE
NITRITE UR QL STRIP.AUTO: NEGATIVE
PH UR: 6 [PH] (ref 5–8)
PROT UR-MCNC: NEGATIVE MG/DL
SP GR UR STRIP: 1.02 (ref 1–1.03)
UROBILINOGEN UR STRIP-ACNC: NORMAL

## 2023-08-01 PROCEDURE — 99204 OFFICE O/P NEW MOD 45 MIN: CPT | Performed by: NURSE PRACTITIONER

## 2023-08-01 NOTE — TELEPHONE ENCOUNTER
I spoke to patient at the . She complains of tearing and itching in both eyes for 6 months. I advised patient to use warm compresses 2 times a day and artificial tears 4-5 times a day. Appointment scheduled with Dr. Kristin Holland on 8/08/23 for an office visit.

## 2023-08-01 NOTE — TELEPHONE ENCOUNTER
Pt came to [facility] desk with watering eyes, left eye red approx 6 mo.

## 2023-08-01 NOTE — PROGRESS NOTES
Subjective:     Patient ID: Mireille Mendoza is a 52year old female. HPI    Patient is a 52year old female who presents to the clinic for an initial visit regarding urinary incontinence. Past medical history of hypertension    Patient presents today for an evaluation regarding stress incontinence. Problem started about 1 year ago. She leaks urine with coughing and sneezing. She denies any urge related incontinence. However she does report some urinary frequency and occasional urgency. She does not wear a pad for incontinence. She has not tried anything for the symptoms. She did recently see her gynecologist who noted some incontinence with valsalva on exam.  He did not see any vaginal prolapse. She denies any dysuria or gross hematuria    She has one child via c- section    She reports a history of a UTI in the past  No family history of urological malignancy    She reports a history of smoking, she quit 15+ years ago. No significant alcohol use. History/Other:   Review of Systems  Pertinent positives and negative per ANDRES. A 10 point ROS was performed and is otherwise negative. Current Outpatient Medications   Medication Sig Dispense Refill    Azelastine HCl 0.05 % Ophthalmic Solution       albuterol 108 (90 Base) MCG/ACT Inhalation Aero Soln Inhale 2 puffs into the lungs every 6 (six) hours as needed for Wheezing. 1 each 3    levocetirizine 5 MG Oral Tab Take 1 tablet (5 mg total) by mouth every evening. (Patient not taking: Reported on 6/6/2023) 90 tablet 1    montelukast (SINGULAIR) 10 MG Oral Tab Take 1 tablet (10 mg total) by mouth nightly. 90 tablet 1    amLODIPine 5 MG Oral Tab Take 1 tablet (5 mg total) by mouth daily.  90 tablet 3     Allergies:  Blood-Group Specifi*    FACE FLUSHING, SHORTNESS OF BREATH,                           TONGUE SWELLING  Dust                    Coughing, ITCHING, WHEEZING    Past Medical History:   Diagnosis Date    Essential hypertension       Past Surgical History:   Procedure Laterality Date      2006    OTHER SURGICAL HISTORY  2017    endometrium    OTHER SURGICAL HISTORY      breast augmentation    OTHER SURGICAL HISTORY  2018    nose    OTHER SURGICAL HISTORY  , 2015    ovary sx      Family History   Problem Relation Age of Onset    Hypertension Mother     Hypertension Maternal Grandfather       Social History:   Social History     Socioeconomic History    Marital status: Single   Tobacco Use    Smoking status: Former     Types: Cigarettes     Quit date: 2006     Years since quittin.6     Passive exposure: Never    Smokeless tobacco: Never   Vaping Use    Vaping Use: Never used   Substance and Sexual Activity    Alcohol use: Yes    Drug use: Never   Other Topics Concern    Reaction to local anesthetic No    Pt has a pacemaker No    Pt has a defibrillator No        Objective:   Physical Exam  HENT:      Head: Normocephalic. Eyes:      Conjunctiva/sclera: Conjunctivae normal.   Pulmonary:      Effort: Pulmonary effort is normal.   Abdominal:      General: There is no distension. Palpations: Abdomen is soft. Tenderness: There is no abdominal tenderness. There is no right CVA tenderness or left CVA tenderness. Musculoskeletal:         General: Normal range of motion. Cervical back: Normal range of motion. Skin:     General: Skin is warm and dry. Neurological:      Mental Status: She is alert and oriented to person, place, and time. Psychiatric:         Mood and Affect: Mood normal.         Behavior: Behavior normal.         Thought Content: Thought content normal.         Judgment: Judgment normal.         Assessment & Plan:   No diagnosis found. Patient is a 52year old female who presents to the clinic for an initial visit regarding stress incontinence. Discussed kegel exercises, she states she has tried these over the last year or so however experiences vaginal pain.       Recommended pelvic floor rehab.  Discussed the role of therapy in improving her symptoms. She is agreeable. Referral will be provided. Follow up after completion of therapy. All questions answered. No orders of the defined types were placed in this encounter.       Meds This Visit:  Requested Prescriptions      No prescriptions requested or ordered in this encounter       Imaging & Referrals:  None

## 2023-08-02 ENCOUNTER — NURSE TRIAGE (OUTPATIENT)
Dept: FAMILY MEDICINE CLINIC | Facility: CLINIC | Age: 48
End: 2023-08-02

## 2023-08-02 DIAGNOSIS — H57.9 ITCHY, WATERY, AND RED EYE: Primary | ICD-10-CM

## 2023-08-02 NOTE — TELEPHONE ENCOUNTER
Action Requested: Summary for Provider     []  Critical Lab, Recommendations Needed  [] Need Additional Advice  []   FYI    [x]   Need Orders  [] Need Medications Sent to Pharmacy  []  Other     SUMMARY: Per protocol: OV. Offered an appointment. Patient is requesting a referral for Dr. Hansel Vanegas. She has an appointment with him on 8/8/23. Referral pending your review and approval.     Future Appointments   Date Time Provider Lan Cardenas   8/8/2023  1:30 PM Neal Mcnamara MD [address] [facility]   9/18/2023  3:30 PM Edna Ramirez MD [facility]       Reason for call: Eye Problem  Onset: Data Unavailable    With language line  Fermin Kaiser  ID # [id number] patient states that she has redness and itchiness to both of her eyes. She states that she thought it was allergies but the drops did not help. She denies pain but her eyes do tear up.     Reason for Disposition   Patient wants to be seen    Protocols used: Eye - Red Without Pus-A-OH

## 2023-08-04 DIAGNOSIS — R31.29 MICROHEMATURIA: Primary | ICD-10-CM

## 2023-08-08 ENCOUNTER — OFFICE VISIT (OUTPATIENT)
Dept: OPHTHALMOLOGY | Facility: CLINIC | Age: 48
End: 2023-08-08

## 2023-08-08 DIAGNOSIS — H04.203 BILATERAL EPIPHORA: Primary | ICD-10-CM

## 2023-08-08 PROCEDURE — 99203 OFFICE O/P NEW LOW 30 MIN: CPT | Performed by: OPHTHALMOLOGY

## 2023-08-08 NOTE — PATIENT INSTRUCTIONS
Bilateral epiphora  Diagnosis discussed with patient. Advised patient to stop Tobramycin. Recommend Lacri-lube ointment OU. Advised patient to see Dr. Taiwo Johnson or Dr. Dorathy Ormond at [facility] to evaluate and treat. Information given to patient.       Will see patient as needed

## 2023-08-08 NOTE — PROGRESS NOTES
Katharina Silvestre is a 52year old female. HPI:     HPI    Pt here for an office visit. Pt's last eye exam was 4+ years ago (pt does not remember). Pt complains of tearing and itching in both eyes for about 1 year. Pt saw Dr. Angel Andrade for allergies on 23 and was prescribed Azelastine BID in both eyes. Pt used the drops for 1 week and stopped because they were very drying and caused more itching. Pt has used several other allergy drops and artificial tears including [place] and Clear Eyes but pt has not found any relief from them. Pt is currently using Tobramycin martín once at night and pt has found some relief. Pt complains of blurry distance and near vision. Pt thinks it is due to excessive tearing. Pt states that 2 weeks ago she had a subconjunctival hemorrhage in the left eye. Pt used artificial tears TID and symptoms resolved. Pt has had LASIK done in both eyes in  in [facility]. Consult: per Dr. Rowe Pa  Last edited by Vee Mckeon O.T. on 2023  2:19 PM.        Patient History:  Past Medical History:   Diagnosis Date    Essential hypertension        Surgical History: Katharina Silvestre has a past surgical history that includes  (); other surgical history () (endometrium); other surgical history () (breast augmentation); other surgical history (2018) (nose); other surgical history (, ) (ovary sx); and LASIK (Bilateral, ) (done in [facility]).     Family History   Problem Relation Age of Onset    Hypertension Mother     Hypertension Maternal Grandfather     Macular degeneration Neg     Diabetes Neg     Glaucoma Neg        Social History:   Social History     Socioeconomic History    Marital status: Single   Tobacco Use    Smoking status: Former     Types: Cigarettes     Quit date: 2006     Years since quittin.6     Passive exposure: Never    Smokeless tobacco: Never   Vaping Use    Vaping Use: Never used   Substance and Sexual Activity Alcohol use: Yes    Drug use: Never   Other Topics Concern    Reaction to local anesthetic No    Pt has a pacemaker No    Pt has a defibrillator No       Medications:  Current Outpatient Medications   Medication Sig Dispense Refill    Azelastine HCl 0.05 % Ophthalmic Solution       albuterol 108 (90 Base) MCG/ACT Inhalation Aero Soln Inhale 2 puffs into the lungs every 6 (six) hours as needed for Wheezing. 1 each 3    montelukast (SINGULAIR) 10 MG Oral Tab Take 1 tablet (10 mg total) by mouth nightly. 90 tablet 1    amLODIPine 5 MG Oral Tab Take 1 tablet (5 mg total) by mouth daily. 90 tablet 3    levocetirizine 5 MG Oral Tab Take 1 tablet (5 mg total) by mouth every evening.  (Patient not taking: Reported on 6/6/2023) 90 tablet 1       Allergies:    Blood-Group Specifi*    FACE FLUSHING, SHORTNESS OF BREATH,                           TONGUE SWELLING  Dust                    Coughing, ITCHING, WHEEZING    ROS:     ROS    Positive for: Eyes  Negative for: Constitutional, Gastrointestinal, Neurological, Skin, Genitourinary, Musculoskeletal, HENT, Endocrine, Cardiovascular, Respiratory, Psychiatric, Allergic/Imm, Heme/Lymph  Last edited by Melinda Velazco OSTEFANO on 8/8/2023  1:55 PM.          PHYSICAL EXAM:     Base Eye Exam       Visual Acuity (Snellen - Linear)         Right Left    Dist sc 20/20 -2 20/20    Near sc 20/40 20/40-              Pupils         Pupils    Right PERRL    Left PERRL              Visual Fields         Left Right     Full Full              Extraocular Movement         Right Left     Full, Ortho Full, Ortho                  Slit Lamp and Fundus Exam       Slit Lamp Exam         Right Left    Lids/Lashes increased tear meniscus, normal puncta increased tear meniscus, normal puncta    Conjunctiva/Sclera non-injection, 1+ Papilla non-injection, 1+ Papilla    Cornea lasik scar inferior, Clear, no fluorescein stain lasik scar temp and inferior, Clear, no fluorescein stain    Anterior Chamber Deep and quiet Deep and quiet    Iris Normal Normal              Fundus Exam         Right Left    Disc Good rim Good rim    C/D Ratio 0.35 0.35                  Lacrimal Exam       Schirmers         Right Left     22 mm 22 mm      Anesthesia: Yes                  Refraction       Wearing Rx       Type: No glasses              Manifest Refraction    Recommend +1.50 over the counter reading glasses per [facility]                    ASSESSMENT/PLAN:     Diagnoses and Plan:     Bilateral epiphora  Diagnosis discussed with patient. Advised patient to stop Tobramycin. Recommend Lacri-lube ointment OU. Advised patient to see Dr. Sydnie Smith or Dr. Gustavo Hernandez at [facility] to evaluate and treat. Information given to patient. Will see patient as needed    No orders of the defined types were placed in this encounter. Meds This Visit:  Requested Prescriptions      No prescriptions requested or ordered in this encounter        Follow up instructions:  Return if symptoms worsen or fail to improve.     8/8/2023  Scribed by: Diane Lozano MD

## 2023-08-09 ENCOUNTER — TELEPHONE (OUTPATIENT)
Dept: SURGERY | Facility: CLINIC | Age: 48
End: 2023-08-09

## 2023-08-09 NOTE — TELEPHONE ENCOUNTER
Patient contacted and results were reviewed with her. Patient understood that further work up is needed. Patient agreed to have office cystoscopy on 9/5/2023 at 3 pm. Patient ws asked to please arrive at 2:30 pm. All orders have been printed and mailed to patients home.

## 2023-08-11 ENCOUNTER — PATIENT MESSAGE (OUTPATIENT)
Dept: OPHTHALMOLOGY | Facility: CLINIC | Age: 48
End: 2023-08-11

## 2023-09-01 ENCOUNTER — OFFICE VISIT (OUTPATIENT)
Dept: FAMILY MEDICINE CLINIC | Facility: CLINIC | Age: 48
End: 2023-09-01

## 2023-09-01 VITALS
HEART RATE: 73 BPM | BODY MASS INDEX: 26 KG/M2 | WEIGHT: 162 LBS | DIASTOLIC BLOOD PRESSURE: 72 MMHG | SYSTOLIC BLOOD PRESSURE: 107 MMHG | TEMPERATURE: 99 F

## 2023-09-01 DIAGNOSIS — Z12.11 COLON CANCER SCREENING: ICD-10-CM

## 2023-09-01 DIAGNOSIS — J30.2 SEASONAL ALLERGIES: ICD-10-CM

## 2023-09-01 DIAGNOSIS — R31.29 MICROHEMATURIA: ICD-10-CM

## 2023-09-01 DIAGNOSIS — H04.553 STENOSIS OF BOTH NASOLACRIMAL DUCTS: Primary | ICD-10-CM

## 2023-09-01 DIAGNOSIS — I83.90 VARICOSE VEIN: ICD-10-CM

## 2023-09-01 DIAGNOSIS — H04.203 EYE TEARING, BILATERAL: ICD-10-CM

## 2023-09-01 LAB
BILIRUB UR QL STRIP.AUTO: NEGATIVE
CLARITY UR REFRACT.AUTO: CLEAR
GLUCOSE UR STRIP.AUTO-MCNC: NORMAL MG/DL
KETONES UR STRIP.AUTO-MCNC: NEGATIVE MG/DL
LEUKOCYTE ESTERASE UR QL STRIP.AUTO: NEGATIVE
NITRITE UR QL STRIP.AUTO: NEGATIVE
PH UR STRIP.AUTO: 6.5 [PH] (ref 5–8)
PROT UR STRIP.AUTO-MCNC: NEGATIVE MG/DL
SP GR UR STRIP.AUTO: 1.02
UROBILINOGEN UR STRIP.AUTO-MCNC: NORMAL MG/DL

## 2023-09-01 PROCEDURE — 3078F DIAST BP <80 MM HG: CPT | Performed by: FAMILY MEDICINE

## 2023-09-01 PROCEDURE — 99214 OFFICE O/P EST MOD 30 MIN: CPT | Performed by: FAMILY MEDICINE

## 2023-09-01 PROCEDURE — 3074F SYST BP LT 130 MM HG: CPT | Performed by: FAMILY MEDICINE

## 2023-09-01 RX ORDER — METHYLPREDNISOLONE 4 MG/1
TABLET ORAL
Qty: 1 EACH | Refills: 0 | Status: SHIPPED | OUTPATIENT
Start: 2023-09-01

## 2023-09-06 ENCOUNTER — TELEPHONE (OUTPATIENT)
Dept: SURGERY | Facility: CLINIC | Age: 48
End: 2023-09-06

## 2023-09-06 NOTE — TELEPHONE ENCOUNTER
Patient would like to reschedule procedure. Would like to discuss procedure. Patient is Citizen of [place] speaking.  Please advise

## 2023-09-07 NOTE — TELEPHONE ENCOUNTER
Called Patient with assistance  of the Language Line : Pricilla Suárez [phone] left a voice mail asking her to give our office a call back           Encounter closed

## 2023-09-11 NOTE — TELEPHONE ENCOUNTER
I called language line used  Moustapha ID # [id number] LM for pt call our office back number provided, await for pt to call back I also sent pt [employer] message.

## 2023-09-15 ENCOUNTER — TELEPHONE (OUTPATIENT)
Dept: CASE MANAGEMENT | Age: 48
End: 2023-09-15

## 2023-09-18 ENCOUNTER — TELEPHONE (OUTPATIENT)
Dept: FAMILY MEDICINE CLINIC | Facility: CLINIC | Age: 48
End: 2023-09-18

## 2023-09-18 DIAGNOSIS — H54.7 VISION PROBLEM: Primary | ICD-10-CM

## 2023-09-21 ENCOUNTER — HOSPITAL ENCOUNTER (OUTPATIENT)
Dept: CT IMAGING | Facility: HOSPITAL | Age: 48
Discharge: HOME | End: 2023-09-21
Attending: NURSE PRACTITIONER
Payer: COMMERCIAL

## 2023-09-21 DIAGNOSIS — R31.29 MICROHEMATURIA: ICD-10-CM

## 2023-09-21 LAB
CREAT BLD-MCNC: 0.7 MG/DL
EGFRCR SERPLBLD CKD-EPI 2021: 107 ML/MIN/1.73M2 (ref 60–?)

## 2023-09-21 PROCEDURE — 74178 CT ABD&PLV WO CNTR FLWD CNTR: CPT | Performed by: NURSE PRACTITIONER

## 2023-09-21 PROCEDURE — 82565 ASSAY OF CREATININE: CPT

## 2023-09-22 RX ORDER — CIPROFLOXACIN 500 MG/1
500 TABLET, FILM COATED ORAL 2 TIMES DAILY
Qty: 14 TABLET | Refills: 0 | Status: SHIPPED | OUTPATIENT
Start: 2023-09-22 | End: 2023-09-29

## 2023-10-10 ENCOUNTER — PROCEDURE (OUTPATIENT)
Dept: SURGERY | Facility: CLINIC | Age: 48
End: 2023-10-10

## 2023-10-10 VITALS — SYSTOLIC BLOOD PRESSURE: 122 MMHG | HEART RATE: 100 BPM | DIASTOLIC BLOOD PRESSURE: 84 MMHG | RESPIRATION RATE: 16 BRPM

## 2023-10-10 DIAGNOSIS — N39.3 STRESS INCONTINENCE: Primary | ICD-10-CM

## 2023-10-10 DIAGNOSIS — R31.29 MICROSCOPIC HEMATURIA: ICD-10-CM

## 2023-10-10 PROCEDURE — 3074F SYST BP LT 130 MM HG: CPT | Performed by: UROLOGY

## 2023-10-10 PROCEDURE — 3079F DIAST BP 80-89 MM HG: CPT | Performed by: UROLOGY

## 2023-10-10 PROCEDURE — 52000 CYSTOURETHROSCOPY: CPT | Performed by: UROLOGY

## 2023-10-10 NOTE — PROCEDURES
CYSTOSCOPY (FEMALE)    PRE-OP DIAGNOSIS: microhematuria    POST-OP DIAGNOSIS: same    PROCEDURE: Cystsocopy    SURGEON: Katelyn Zelaya MD    ASSISTANT: none     EBL: minimal    FINDINGS:   Urethra: No urethral lesions, no urethral strictures  Bladder: Bilateral ureteral orifices in orthotopic position with efflux, no suspicious or concerning erythematous lesions, no papillary bladder tumors, no stones   Retroflexion: no abnormalities   Other findings: none    INDICATIONS:  (). Microhematuria. CT negative. Patient extremely emotional prior to procedure. PROCEDURE: Patient was brought to the procedure suite and a timeout was performed identifying the patient,  and procedure being performed. The risks of the procedure were once again detailed to the patient including bleeding, infection, dysuria. The patient agreed to proceed. The patient had a negative urinalysis. No antibiotics were given to patient prior to this procedure. She was placed in a supine position on the table and a flexible cystoscope was inserted per urethra. There were no obvious urethral lesions or strictures. Once in the bladder we performed a full diagnostic/surveillance cystoscopy which demonstrated no abnormalities. On retroflexion we noted no abnormalities. The scope was then carefully removed and once again no urethral abnormalities were noted. There were no complications after this procedure and the patient tolerated the procedure without issue.     IMPRESSION: cysto negative    PLAN:    RTC prn

## 2023-10-12 ENCOUNTER — HOSPITAL ENCOUNTER (OUTPATIENT)
Dept: CT IMAGING | Facility: HOSPITAL | Age: 48
Discharge: HOME | End: 2023-10-12
Attending: INTERNAL MEDICINE
Payer: COMMERCIAL

## 2023-10-12 ENCOUNTER — HOSPITAL ENCOUNTER (OUTPATIENT)
Dept: ULTRASOUND IMAGING | Facility: HOSPITAL | Age: 48
Discharge: HOME | End: 2023-10-12
Attending: OBSTETRICS & GYNECOLOGY
Payer: COMMERCIAL

## 2023-10-12 DIAGNOSIS — R10.2 PELVIC PAIN: ICD-10-CM

## 2023-10-12 DIAGNOSIS — R91.8 LUNG NODULES: ICD-10-CM

## 2023-10-12 DIAGNOSIS — Z87.42 HISTORY OF ENDOMETRIOSIS: ICD-10-CM

## 2023-10-12 PROCEDURE — 76830 TRANSVAGINAL US NON-OB: CPT | Performed by: OBSTETRICS & GYNECOLOGY

## 2023-10-12 PROCEDURE — 71250 CT THORAX DX C-: CPT | Performed by: INTERNAL MEDICINE

## 2023-10-12 PROCEDURE — 76856 US EXAM PELVIC COMPLETE: CPT | Performed by: OBSTETRICS & GYNECOLOGY

## 2023-10-23 ENCOUNTER — TELEPHONE (OUTPATIENT)
Dept: OBGYN CLINIC | Facility: CLINIC | Age: 48
End: 2023-10-23

## 2023-10-23 NOTE — TELEPHONE ENCOUNTER
I called pt to inform her of normal pap results and that provider would like her to schedule  a follow up appt next week. No answer left voicemail for pt to return my call.

## 2023-10-23 NOTE — TELEPHONE ENCOUNTER
----- Message from Pablo Sánchez MD sent at 10/23/2023  8:46 AM CDT -----  Please inform normal pelvic ultrasound,   set up f/ u appt next week in office

## 2023-10-24 RX ORDER — LEVOCETIRIZINE DIHYDROCHLORIDE 5 MG/1
5 TABLET, FILM COATED ORAL EVERY EVENING
Qty: 90 TABLET | Refills: 1 | Status: SHIPPED | OUTPATIENT
Start: 2023-10-24

## 2023-10-24 NOTE — TELEPHONE ENCOUNTER
Refill requested for   Requested Prescriptions   Pending Prescriptions Disp Refills    LEVOCETIRIZINE 5 MG Oral Tab [Pharmacy Med Name: LEVOCETIRIZINE 5 MG TABLET] 30 tablet 5     Sig: TAKE 1 TABLET BY MOUTH EVERY DAY IN THE EVENING       Antihistamines Passed - 10/24/2023  1:23 AM        Passed - Appt in past 12 mos or next 1 mos     Recent Outpatient Visits              1 month ago Stenosis of both nasolacrimal ducts    [id number] [facility], Meenu Jamison MD    Office Visit    2 months ago Bilateral epiphora    [address] [facility], Jorge Grant MD    Office Visit    2 months ago Stress incontinence    [address] [facility], [place], [address], APRN    Office Visit    3 months ago Chronic cough    [address], [facility], Nola Chávez MD    Office Visit    4 months ago Encounter for gynecological examination without abnormal finding    Jennie Aguayo MD    Office Visit                          Last office visit: 5/2/23     Previously advised to follow up in No follow-up timeline advised to last office visit. Diagnosis of AR advised to follow-up in 1 year    F/U currently scheduled?  Not at this time    ACTION: Refill filled per protocol

## 2023-10-26 RX ORDER — MONTELUKAST SODIUM 10 MG/1
10 TABLET ORAL NIGHTLY
Qty: 90 TABLET | Refills: 1 | Status: SHIPPED | OUTPATIENT
Start: 2023-10-26

## 2023-10-26 NOTE — TELEPHONE ENCOUNTER
Requested Prescriptions   Pending Prescriptions Disp Refills    MONTELUKAST 10 MG Oral Tab [Pharmacy Med Name: MONTELUKAST SOD 10 MG TABLET] 90 tablet 1     Sig: TAKE 1 TABLET BY MOUTH EVERY DAY AT NIGHT       Corticosteroids / Long-Acting Bronchodilators Passed - 10/26/2023  1:24 AM        Passed - Appt in past 6 mos or next 3 mos     Recent Outpatient Visits              1 month ago Stenosis of both nasolacrimal ducts    [address] [facility], Qasim Prescott MD    Office Visit    2 months ago Bilateral epiphora    [address] [facility], Priscila Avalos MD    Office Visit    2 months ago Stress incontinence    [address] [facility], Abiola retickr, [address], APRN    Office Visit    3 months ago Chronic cough    [facility], [facility], Evangelina Castro MD    Office Visit    4 months ago Encounter for gynecological examination without abnormal finding    Jennie Mirza MD    Office Visit

## 2023-11-28 ENCOUNTER — OFFICE VISIT (OUTPATIENT)
Dept: ALLERGY | Facility: CLINIC | Age: 48
End: 2023-11-28
Payer: COMMERCIAL

## 2023-11-28 VITALS
DIASTOLIC BLOOD PRESSURE: 92 MMHG | WEIGHT: 162 LBS | HEART RATE: 88 BPM | BODY MASS INDEX: 26 KG/M2 | OXYGEN SATURATION: 98 % | SYSTOLIC BLOOD PRESSURE: 133 MMHG

## 2023-11-28 DIAGNOSIS — H10.10 SEASONAL AND PERENNIAL ALLERGIC RHINOCONJUNCTIVITIS: Primary | ICD-10-CM

## 2023-11-28 DIAGNOSIS — Z92.29 COVID-19 VACCINE SERIES COMPLETED: ICD-10-CM

## 2023-11-28 DIAGNOSIS — J45.20 MILD INTERMITTENT REACTIVE AIRWAY DISEASE WITHOUT COMPLICATION: ICD-10-CM

## 2023-11-28 DIAGNOSIS — Z23 FLU VACCINE NEED: ICD-10-CM

## 2023-11-28 DIAGNOSIS — L30.9 DERMATITIS: ICD-10-CM

## 2023-11-28 DIAGNOSIS — J30.2 SEASONAL AND PERENNIAL ALLERGIC RHINOCONJUNCTIVITIS: Primary | ICD-10-CM

## 2023-11-28 DIAGNOSIS — J30.89 SEASONAL AND PERENNIAL ALLERGIC RHINOCONJUNCTIVITIS: Primary | ICD-10-CM

## 2023-11-28 PROCEDURE — 99214 OFFICE O/P EST MOD 30 MIN: CPT | Performed by: ALLERGY & IMMUNOLOGY

## 2023-11-28 PROCEDURE — 3075F SYST BP GE 130 - 139MM HG: CPT | Performed by: ALLERGY & IMMUNOLOGY

## 2023-11-28 PROCEDURE — 3080F DIAST BP >= 90 MM HG: CPT | Performed by: ALLERGY & IMMUNOLOGY

## 2023-11-28 RX ORDER — LEVOCETIRIZINE DIHYDROCHLORIDE 5 MG/1
5 TABLET, FILM COATED ORAL 2 TIMES DAILY
Qty: 180 TABLET | Refills: 1 | Status: SHIPPED | OUTPATIENT
Start: 2023-11-28

## 2023-11-28 RX ORDER — ALBUTEROL SULFATE 90 UG/1
2 AEROSOL, METERED RESPIRATORY (INHALATION) EVERY 6 HOURS PRN
Qty: 1 EACH | Refills: 0 | Status: SHIPPED | OUTPATIENT
Start: 2023-11-28

## 2023-11-28 NOTE — PROGRESS NOTES
Miroslava Covarrubias is a 52year old female. HPI:     Chief Complaint   Patient presents with    [address] [place] [address] #: [id number]. Patient presents for a f/u appointment. Last seen in Allergy 2023. She offers that seasonal/environmental allergies are flaring. Patient c/o watery eyes and periocular reaction. Asthma     Patient reports that reactive airway disease is not controlled. She is taking Albuterol 2-3 times a week. Patient is a 45-year-old female who presents for follow-up with a chief complaint of allergies  Patient last seen by me on May 2, 2023. Medication list include Xyzal Singulair Astelin  Prior serum IgE testing in  showed sensitization to cats more so than dog and dust mites  Prior skin testing was positive dust mites on scratch testing. Patient deferred intradermal testing    COVID-vaccine x 2 doses  No flu vaccine on record in EMR    Today patient reports    Ar:  Active or persistent symptoms:  yes   Still with we, re ie   Active meds: Xyzal Singulair optivar   Eye drops cause dry eye and contact rash  around her eyes  pets :none   No fever or MP   Saw optho x 2 . No better    2 cats     Interested in ait     rad  Active or persistent symptoms more than 2 days/week denies  ED visits or prednisone in the interim denies  Active meds: Albuterol as needed  Patient saw pulmonary interim with Dr. Dennis Caraballo   PFT never completed. Also followed by pulmonary for lung nodules. Follow-up CT of chest advised and completed on 2023 which showed nodule unchanged from 2023.   Report reviewed  Cough better in interim , alb prn  3x/week     Former smoker        HISTORY:  Past Medical History:   Diagnosis Date    Essential hypertension       Past Surgical History:   Procedure Laterality Date      2006    LASIK Bilateral 2008    done in [address] [place] [address]    endometrium    OTHER SURGICAL HISTORY     breast augmentation    OTHER SURGICAL HISTORY  2018    nose    OTHER SURGICAL HISTORY  ,     ovary sx      Family History   Problem Relation Age of Onset    Hypertension Mother     Hypertension Maternal Grandfather     Macular degeneration Neg     Diabetes Neg     Glaucoma Neg       Social History:   Social History     Socioeconomic History    Marital status: Single   Tobacco Use    Smoking status: Former     Types: Cigarettes     Quit date: 2006     Years since quittin.9     Passive exposure: Never    Smokeless tobacco: Never   Vaping Use    Vaping Use: Never used   Substance and Sexual Activity    Alcohol use: Yes    Drug use: Never   Other Topics Concern    Reaction to local anesthetic No    Pt has a pacemaker No    Pt has a defibrillator No        Medications (Active prior to today's visit):  Current Outpatient Medications   Medication Sig Dispense Refill    levocetirizine 5 MG Oral Tab Take 1 tablet (5 mg total) by mouth in the morning and 1 tablet (5 mg total) before bedtime. 180 tablet 1    hydrocortisone 2.5 % External Ointment Applly bid to involved areas as needed 56 g 0    albuterol (PROAIR HFA) 108 (90 Base) MCG/ACT Inhalation Aero Soln Inhale 2 puffs into the lungs every 6 (six) hours as needed for Wheezing. 1 each 0    MONTELUKAST 10 MG Oral Tab TAKE 1 TABLET BY MOUTH EVERY DAY AT NIGHT 90 tablet 1    levocetirizine 5 MG Oral Tab TAKE 1 TABLET BY MOUTH EVERY DAY IN THE EVENING 90 tablet 1    albuterol 108 (90 Base) MCG/ACT Inhalation Aero Soln Inhale 2 puffs into the lungs every 6 (six) hours as needed for Wheezing. 1 each 3    amLODIPine 5 MG Oral Tab Take 1 tablet (5 mg total) by mouth daily. 90 tablet 3    methylPREDNISolone (MEDROL) 4 MG Oral Tablet Therapy Pack As directed. 1 each 0    Azelastine HCl 0.05 % Ophthalmic Solution  (Patient not taking: Reported on 2023)         Allergies:   Allergies   Allergen Reactions    Blood-Group Specific Substance FACE FLUSHING, SHORTNESS OF BREATH and TONGUE SWELLING    Dust Coughing, ITCHING and WHEEZING         ROS:   Allergic/Immuno:  See hpi  Cardiovascular:  Negative for irregular heartbeat/palpitations, chest pain, edema  Constitutional:  Negative night sweats,weight loss, irritability and lethargy  ENMT:  Negative for ear drainage, hearing loss and nasal drainage  Eyes:  Negative for eye discharge and vision loss  Gastrointestinal:  Negative for abdominal pain, diarrhea and vomiting  Integumentary:  Negative for pruritus and rash  Respiratory:  Negative for cough, dyspnea and wheezing    PHYSICAL EXAM:   Constitutional: responsive, no acute distress noted  Head/Face: NC/Atraumatic  Eyes/Vision: conjunctiva and lids are normal extraocular motion is intact   Ears/Audiometry: tympanic membranes are normal bilaterally hearing is grossly intact  Nose/Mouth/Throat: nose and throat are clear mucous membranes are moist   Neck/Thyroid: neck is supple without adenopathy  Lymphatic: no abnormal cervical, supraclavicular or axillary adenopathy is noted  Respiratory: normal to inspection lungs are clear to auscultation bilaterally normal respiratory effort   Cardiovascular: regular rate and rhythm no murmurs, gallups, or rubs  Abdomen: soft non-tender non-distended  Skin/Hair: no unusual rashes present   Extremities: no edema, cyanosis, or clubbing     ASSESSMENT/PLAN:   Assessment   Encounter Diagnoses   Name Primary? Seasonal and perennial allergic rhinoconjunctivitis Yes    COVID-19 vaccine series completed     Flu vaccine need     Mild intermittent reactive airway disease without complication     Dermatitis        #1 allergic rhinitis and conjunctivitis  Increase Xyzal to twice a day or try Allegra in the morning and Xyzal in the evening  Continue with singular, montelukast 10 mg once a day  Has tried Optivar and Pataday in the past but too drying in nature  Trial of Zaditor 1 drop per eye 1-2 times per day. Cool compresses. *Eyedrops in mile rater. Start immunotherapy to underlying environmental allergens as patient has been refractory to medications and avoidance measures. 2 cats in the home    #2 reactive airway disease. Mild intermittent by history. Continue with singular once a day  Have PFT testing completed that was ordered by pulmonary  Albuterol every 4-6 hours if having active coughing wheezing or shortness of breath    #3 flu vaccine recommended and offered. #4 COVID vaccines reviewed. Recommend booster this fall    Follow-up in 6 months or sooner if needed         Orders This Visit:  No orders of the defined types were placed in this encounter. Meds This Visit:  Requested Prescriptions     Signed Prescriptions Disp Refills    levocetirizine 5 MG Oral Tab 180 tablet 1     Sig: Take 1 tablet (5 mg total) by mouth in the morning and 1 tablet (5 mg total) before bedtime. hydrocortisone 2.5 % External Ointment 56 g 0     Sig: Applly bid to involved areas as needed    albuterol (PROAIR HFA) 108 (90 Base) MCG/ACT Inhalation Aero Soln 1 each 0     Sig: Inhale 2 puffs into the lungs every 6 (six) hours as needed for Wheezing. Imaging & Referrals:  None     11/28/2023  Romina Villasenor MD    If medication samples were provided today, they were provided solely for patient education and training related to self administration of these medications. Teaching, instruction and sample was provided to the patient by myself. Teaching included  a review of potential adverse side effects as well as potential efficacy. Patient's questions were answered in regards to medication administration and dosing and potential side effects.  Teaching was provided via the teach back method

## 2023-11-28 NOTE — PATIENT INSTRUCTIONS
1 allergic rhinitis and conjunctivitis  Increase Xyzal to twice a day or try Allegra in the morning and Xyzal in the evening  Continue with singular, montelukast 10 mg once a day  Has tried Optivar and Pataday in the past but too drying in nature  Trial of Zaditor 1 drop per eye 1-2 times per day. Cool compresses. *Eyedrops in fridge rater. Start immunotherapy to underlying environmental allergens as patient has been refractory to medications and avoidance measures. 2 cats in the home    #2 reactive airway disease. Mild intermittent by history. Continue with singular once a day  Have PFT testing completed that was ordered by pulmonary  Albuterol every 4-6 hours if having active coughing wheezing or shortness of breath    #3 flu vaccine recommended and offered. #4 COVID vaccines reviewed. Recommend booster this fall    Follow-up in 6 months or sooner if needed         Orders This Visit:  No orders of the defined types were placed in this encounter. Meds This Visit:  Requested Prescriptions     Signed Prescriptions Disp Refills    levocetirizine 5 MG Oral Tab 180 tablet 1     Sig: Take 1 tablet (5 mg total) by mouth in the morning and 1 tablet (5 mg total) before bedtime. hydrocortisone 2.5 % External Ointment 56 g 0     Sig: Applly bid to involved areas as needed    albuterol (PROAIR HFA) 108 (90 Base) MCG/ACT Inhalation Aero Soln 1 each 0     Sig: Inhale 2 puffs into the lungs every 6 (six) hours as needed for Wheezing.

## 2023-11-29 ENCOUNTER — TELEPHONE (OUTPATIENT)
Dept: ALLERGY | Facility: CLINIC | Age: 48
End: 2023-11-29

## 2023-11-29 NOTE — TELEPHONE ENCOUNTER
Left a message for patient to please contact her PCP to request a referral for allergy shots to include 99 visits, to please contact our office with any further questions.

## 2023-12-12 ENCOUNTER — TELEMEDICINE (OUTPATIENT)
Dept: FAMILY MEDICINE CLINIC | Facility: CLINIC | Age: 48
End: 2023-12-12
Payer: COMMERCIAL

## 2023-12-12 DIAGNOSIS — J30.81 CAT ALLERGIES: ICD-10-CM

## 2023-12-12 DIAGNOSIS — R76.11 POSITIVE TB TEST: Primary | ICD-10-CM

## 2023-12-12 PROCEDURE — 99213 OFFICE O/P EST LOW 20 MIN: CPT | Performed by: FAMILY MEDICINE

## 2023-12-12 NOTE — PROGRESS NOTES
Virtual Telephone Check-In    Ne Hi verbally consents to a Virtual/Telephone Check-In service on 12/12/23. Patient understands and accepts financial responsibility for any deductible, co-insurance and/or co-pays associated with this service. Duration of the service: 15 minutes  This telemedicine visit was conducted using live audio and video. Summary of topics discussed:     Patient has been seen by allergy and is interested in immunotherapy. She has her first set of allergy shots scheduled but needs a referral.  See previous note for more detail on her allergies. She is currently in the process of getting her citizenship and paid out-of-pocket for testing. Her initial TB screen was positive. States that she did not repeat this screening but that a chest x-ray was ordered and completed. She has follow-up with the physician that ordered it and is going to see whether she will need treatment for latent TB.  6 states that she will make another appointment with me once she has had that consultation     Physical Exam:  General: alert, speaking in full sentences, no acute distress  Lungs: respirations sound unlabored, no audible wheezing with speaking. Neurologic: alert, oriented x3    Assessment and plan:    1. Positive TB test  -As above. 2. Cat allergies  - Allergy Referral - In Network      Advised to call back clinic if no improvement in symptoms. Red flags discussed to go to [facility].      Karla Gallardo MD

## 2023-12-12 NOTE — TELEPHONE ENCOUNTER
Dr. Wisam Morfin, patient will need the referral to state 99 visits to include weekly allergy shots and to cover any office visits with Dr. Sheldon Saldaña. Thank you.

## 2023-12-12 NOTE — TELEPHONE ENCOUNTER
Spoke to patient allergy referral placed. Does the referral needed CPT codes or anything else? Please review it and let me know.   Thank you

## 2024-01-09 ENCOUNTER — PATIENT MESSAGE (OUTPATIENT)
Dept: FAMILY MEDICINE CLINIC | Facility: CLINIC | Age: 49
End: 2024-01-09

## 2024-01-09 ENCOUNTER — TELEMEDICINE (OUTPATIENT)
Dept: FAMILY MEDICINE CLINIC | Facility: CLINIC | Age: 49
End: 2024-01-09
Payer: COMMERCIAL

## 2024-01-09 ENCOUNTER — TELEPHONE (OUTPATIENT)
Dept: FAMILY MEDICINE CLINIC | Facility: CLINIC | Age: 49
End: 2024-01-09

## 2024-01-09 DIAGNOSIS — H10.13 ALLERGIC CONJUNCTIVITIS OF BOTH EYES: Primary | ICD-10-CM

## 2024-01-09 DIAGNOSIS — Z22.7 TB LUNG, LATENT: ICD-10-CM

## 2024-01-09 PROCEDURE — 99214 OFFICE O/P EST MOD 30 MIN: CPT | Performed by: FAMILY MEDICINE

## 2024-01-09 RX ORDER — CETIRIZINE 2.4 MG/ML
1 SOLUTION/ DROPS OPHTHALMIC DAILY
Qty: 30 EACH | Refills: 0 | Status: SHIPPED | OUTPATIENT
Start: 2024-01-09 | End: 2024-01-14

## 2024-01-09 NOTE — TELEPHONE ENCOUNTER
See note below and advise on alternative. I called the pharmacy and spoke with the lead tech who advised we need to inquire with the provider as the pharmacist is not sure of any alternative.    Please advise, thanks

## 2024-01-09 NOTE — TELEPHONE ENCOUNTER
Pharmacy requesting alternative, med not covered by plan     Cetirizine HCl (ZERVIATE) 0.24 % Ophthalmic Solution Apply 1 each to eye daily. 30 each 0

## 2024-01-11 RX ORDER — PYRIDOXINE HCL (VITAMIN B6) 50 MG
50 TABLET ORAL DAILY
Qty: 90 TABLET | Refills: 3 | Status: SHIPPED | OUTPATIENT
Start: 2024-01-11

## 2024-01-11 RX ORDER — ISONIAZID 300 MG/1
300 TABLET ORAL DAILY
Qty: 90 TABLET | Refills: 3 | Status: SHIPPED | OUTPATIENT
Start: 2024-01-11

## 2024-01-11 NOTE — PROGRESS NOTES
Virtual Telephone Check-In    Kajal Benedict verbally consents to a Virtual/Telephone Check-In service on 01/11/24.    Patient understands and accepts financial responsibility for any deductible, co-insurance and/or co-pays associated with this service.    Duration of the service: 15 minutes  This telemedicine visit was conducted using live audio and video.     Summary of topics discussed:     Patient tested positive for latent TB and was prescribed isoniazid 300 mg 1 p.o. daily for 9 months and vitamin B6 50 mg 1 p.o. daily for 9 months as well.  Chest x-ray was unremarkable.  Chest x-ray was done on 12/8/2023    Physical Exam:  General: alert, speaking in full sentences, no acute distress  Lungs: respirations sound unlabored, no audible wheezing with speaking.  Neurologic: alert, oriented x3    Assessment and plan:    1. Allergic conjunctivitis of both eyes  - Cetirizine HCl (ZERVIATE) 0.24 % Ophthalmic Solution; Apply 1 each to eye daily.  Dispense: 30 each; Refill: 0    2. TB lung, latent  -Was seen by outside provider QuantiFERON TB was positive on 11/8/2023.  Chest x-ray was negative on 12/8/2023.  Patient was prescribed isoniazid and pyridoxine for 9 months.  Prescription was given but not filled.  Patient requesting that I sent to pharmacy.  - isoniazid 300 MG Oral Tab; Take 1 tablet (300 mg total) by mouth daily.  Dispense: 90 tablet; Refill: 3  - pyridoxine 50 MG Oral Tab; Take 1 tablet (50 mg total) by mouth daily.  Dispense: 90 tablet; Refill: 3      Advised to call back clinic if no improvement in symptoms. Red flags discussed to go to [facility] Munoz MD

## 2024-01-14 RX ORDER — OLOPATADINE HYDROCHLORIDE 2 MG/ML
1 SOLUTION/ DROPS OPHTHALMIC DAILY
Qty: 2.5 ML | Refills: 3 | Status: SHIPPED | OUTPATIENT
Start: 2024-01-14

## 2024-02-29 NOTE — TELEPHONE ENCOUNTER
----- Message from Dmitri Issa sent at 2/29/2024  6:22 AM CST -----  Regarding: Need Help  Contact: [phone]  This is going to sound crazy, But I'm pretty sure I broke a bone in my right foot last night. Really hurts. Can someone call me [phone]. Thanks.   Please inform patient that her labs show that her cholesterol, thyroid, liver and kidney function are normal.  Her blood counts show some likely iron deficiency. She is mildly anemic. Sending iron supplementation to the pharmacy for her to start taking.

## 2024-03-30 DIAGNOSIS — I10 ESSENTIAL HYPERTENSION: ICD-10-CM

## 2024-04-02 NOTE — TELEPHONE ENCOUNTER
Please review. Protocol failed/No protocol      Requested Prescriptions   Pending Prescriptions Disp Refills    AMLODIPINE 5 MG Oral Tab [Pharmacy Med Name: AMLODIPINE BESYLATE 5 MG TAB] 90 tablet 3     Sig: Take 1 tablet (5 mg total) by mouth daily.       Hypertension Medications Protocol Failed - 3/30/2024  7:15 AM        Failed - CMP or BMP in past 12 months        Failed - Last BP reading less than 140/90     BP Readings from Last 1 Encounters:   11/28/23 (!) 133/92               Passed - In person appointment or virtual visit in the past 12 mos or appointment in next 3 mos     Recent Outpatient Visits              2 months ago Allergic conjunctivitis of both eyes    [facility], Mis Hopkins MD    Telemedicine    3 months ago Positive TB test    [facility], Mis Hopkins MD    Telemedicine    4 months ago Seasonal and perennial allergic rhinoconjunctivitis    [facility], Hector Thompson MD    Office Visit    7 months ago Stenosis of both nasolacrimal ducts    [facility], Mis Hopkins MD    Office Visit    7 months ago Bilateral epiphora    [facility] Alexis Rojo MD    Office Visit                      Passed - EGFRCR or GFRNAA > 50     GFR Evaluation  EGFRCR: 107 , resulted on 9/21/2023               Recent Outpatient Visits              2 months ago Allergic conjunctivitis of both eyes    [facility], Mis Hopkins MD    Telemedicine    3 months ago Positive TB test    [facility], Mis Hopkins MD    Telemedicine    4 months ago Seasonal and perennial allergic rhinoconjunctivitis    [facility], Hector Thompson MD    Office Visit    7 months ago Stenosis  of both nasolacrimal ducts    [facility], [facility], Mis Hopkins MD    Office Visit    7 months ago Bilateral epiphora    [facility], [facility], Alexis Rojo MD    Office Visit

## 2024-04-04 RX ORDER — AMLODIPINE BESYLATE 5 MG/1
5 TABLET ORAL DAILY
Qty: 90 TABLET | Refills: 3 | Status: SHIPPED | OUTPATIENT
Start: 2024-04-04

## 2024-04-17 ENCOUNTER — NURSE ONLY (OUTPATIENT)
Dept: ALLERGY | Facility: CLINIC | Age: 49
End: 2024-04-17

## 2024-04-17 DIAGNOSIS — J30.89 ENVIRONMENTAL AND SEASONAL ALLERGIES: Primary | ICD-10-CM

## 2024-04-17 PROCEDURE — 95117 IMMUNOTHERAPY INJECTIONS: CPT | Performed by: ALLERGY & IMMUNOLOGY

## 2024-04-17 PROCEDURE — 95165 ANTIGEN THERAPY SERVICES: CPT | Performed by: ALLERGY & IMMUNOLOGY

## 2024-04-19 RX ORDER — MONTELUKAST SODIUM 10 MG/1
10 TABLET ORAL NIGHTLY
Qty: 90 TABLET | Refills: 0 | Status: SHIPPED | OUTPATIENT
Start: 2024-04-19

## 2024-04-21 DIAGNOSIS — Z22.7 TB LUNG, LATENT: ICD-10-CM

## 2024-04-23 ENCOUNTER — NURSE ONLY (OUTPATIENT)
Dept: ALLERGY | Facility: CLINIC | Age: 49
End: 2024-04-23
Payer: COMMERCIAL

## 2024-04-23 DIAGNOSIS — J30.89 ENVIRONMENTAL AND SEASONAL ALLERGIES: Primary | ICD-10-CM

## 2024-04-23 PROCEDURE — 95117 IMMUNOTHERAPY INJECTIONS: CPT | Performed by: ALLERGY & IMMUNOLOGY

## 2024-04-23 RX ORDER — ISONIAZID 300 MG/1
300 TABLET ORAL DAILY
Qty: 90 TABLET | Refills: 3 | OUTPATIENT
Start: 2024-04-23

## 2024-04-30 ENCOUNTER — NURSE ONLY (OUTPATIENT)
Dept: ALLERGY | Facility: CLINIC | Age: 49
End: 2024-04-30

## 2024-04-30 DIAGNOSIS — J30.89 ENVIRONMENTAL AND SEASONAL ALLERGIES: Primary | ICD-10-CM

## 2024-04-30 PROCEDURE — 95117 IMMUNOTHERAPY INJECTIONS: CPT | Performed by: ALLERGY & IMMUNOLOGY

## 2024-05-01 RX ORDER — LEVOCETIRIZINE DIHYDROCHLORIDE 5 MG/1
5 TABLET, FILM COATED ORAL EVERY EVENING
Qty: 30 TABLET | Refills: 5 | Status: SHIPPED | OUTPATIENT
Start: 2024-05-01

## 2024-05-01 NOTE — TELEPHONE ENCOUNTER
Requested Prescriptions   Pending Prescriptions Disp Refills    LEVOCETIRIZINE 5 MG Oral Tab [Pharmacy Med Name: LEVOCETIRIZINE 5 MG TABLET] 30 tablet 5     Sig: TAKE 1 TABLET BY MOUTH EVERY DAY IN THE EVENING       Antihistamines Passed - 5/1/2024 12:53 AM        Passed - Appt in past 12 mos or next 1 mos     Recent Outpatient Visits              Yesterday Environmental and seasonal allergies [J30.89]    [facility]    Nurse Only    1 week ago Environmental and seasonal allergies    [facility]    Nurse Only    2 weeks ago Environmental and seasonal allergies    [facility]    Nurse Only    3 months ago [facility] conjunct[facility] both [facility]    4 months ago [facility] test    [facility]          Future Appointments         Provider Department Appt Notes    In 6 days [facility]     In 1 week [facility]                        Refilled per med refill protocol.

## 2024-05-07 ENCOUNTER — NURSE ONLY (OUTPATIENT)
Dept: ALLERGY | Facility: CLINIC | Age: 49
End: 2024-05-07

## 2024-05-07 DIAGNOSIS — J30.89 ENVIRONMENTAL AND SEASONAL ALLERGIES: Primary | ICD-10-CM

## 2024-05-07 PROCEDURE — 95117 IMMUNOTHERAPY INJECTIONS: CPT | Performed by: ALLERGY & IMMUNOLOGY

## 2024-05-14 ENCOUNTER — NURSE ONLY (OUTPATIENT)
Dept: ALLERGY | Facility: CLINIC | Age: 49
End: 2024-05-14

## 2024-05-14 DIAGNOSIS — J30.89 ENVIRONMENTAL AND SEASONAL ALLERGIES: Primary | ICD-10-CM

## 2024-05-14 PROCEDURE — 95117 IMMUNOTHERAPY INJECTIONS: CPT | Performed by: ALLERGY & IMMUNOLOGY

## 2024-05-21 ENCOUNTER — NURSE ONLY (OUTPATIENT)
Dept: ALLERGY | Facility: CLINIC | Age: 49
End: 2024-05-21

## 2024-05-21 DIAGNOSIS — J30.89 ENVIRONMENTAL AND SEASONAL ALLERGIES: Primary | ICD-10-CM

## 2024-05-21 PROCEDURE — 95117 IMMUNOTHERAPY INJECTIONS: CPT | Performed by: ALLERGY & IMMUNOLOGY

## 2024-05-21 PROCEDURE — 95165 ANTIGEN THERAPY SERVICES: CPT | Performed by: ALLERGY & IMMUNOLOGY

## 2024-05-28 ENCOUNTER — NURSE ONLY (OUTPATIENT)
Dept: ALLERGY | Facility: CLINIC | Age: 49
End: 2024-05-28

## 2024-05-28 DIAGNOSIS — J30.89 ENVIRONMENTAL AND SEASONAL ALLERGIES: Primary | ICD-10-CM

## 2024-05-28 PROCEDURE — 95117 IMMUNOTHERAPY INJECTIONS: CPT | Performed by: ALLERGY & IMMUNOLOGY

## 2024-06-04 ENCOUNTER — NURSE ONLY (OUTPATIENT)
Dept: ALLERGY | Facility: CLINIC | Age: 49
End: 2024-06-04

## 2024-06-04 DIAGNOSIS — J30.89 ENVIRONMENTAL AND SEASONAL ALLERGIES: Primary | ICD-10-CM

## 2024-06-04 PROCEDURE — 95117 IMMUNOTHERAPY INJECTIONS: CPT | Performed by: ALLERGY & IMMUNOLOGY

## 2024-06-11 ENCOUNTER — NURSE ONLY (OUTPATIENT)
Dept: ALLERGY | Facility: CLINIC | Age: 49
End: 2024-06-11

## 2024-06-11 DIAGNOSIS — J30.89 ENVIRONMENTAL AND SEASONAL ALLERGIES: Primary | ICD-10-CM

## 2024-06-11 PROCEDURE — 95117 IMMUNOTHERAPY INJECTIONS: CPT | Performed by: ALLERGY & IMMUNOLOGY

## 2024-06-18 ENCOUNTER — NURSE ONLY (OUTPATIENT)
Dept: ALLERGY | Facility: CLINIC | Age: 49
End: 2024-06-18

## 2024-06-18 DIAGNOSIS — J30.89 ENVIRONMENTAL AND SEASONAL ALLERGIES: Primary | ICD-10-CM

## 2024-06-18 PROCEDURE — 95117 IMMUNOTHERAPY INJECTIONS: CPT | Performed by: ALLERGY & IMMUNOLOGY

## 2024-06-20 ENCOUNTER — TELEMEDICINE (OUTPATIENT)
Dept: FAMILY MEDICINE CLINIC | Facility: CLINIC | Age: 49
End: 2024-06-20

## 2024-06-20 DIAGNOSIS — A63.0 GENITAL WARTS: Primary | ICD-10-CM

## 2024-06-20 PROCEDURE — 99442 PHONE E/M BY PHYS 11-20 MIN: CPT | Performed by: FAMILY MEDICINE

## 2024-06-21 NOTE — PROGRESS NOTES
Virtual Telephone Check-In    Kajal Benedict verbally consents to a Virtual/Telephone Check-In service on 06/21/24.    Patient understands and accepts financial responsibility for any deductible, co-insurance and/or co-pays associated with this service.    Duration of the service: 15 minutes  This telemedicine visit was conducted using live audio     Summary of topics discussed:     Patient is concerned about new genital lesions that have appeared.  She has looked online and states that they look similar to genital warts.  Has never had similar lesions in the past.  Per patient her previous Pap smears have been normal.  Requesting referral for treatment.    Physical Exam:  General: alert, speaking in full sentences, no acute distress  Lungs: respirations sound unlabored, no audible wheezing with speaking.  Neurologic: alert, oriented x3    Assessment and plan:    1. Genital warts  Referral given to gynecology for evaluation and treatment of genital warts.  - OBG Referral - In Network  - OBG Referral - [place] ([place])      Advised to call back clinic if no improvement in symptoms. Red flags discussed to go to ER.     Mis Munoz MD

## 2024-06-25 ENCOUNTER — NURSE ONLY (OUTPATIENT)
Dept: ALLERGY | Facility: CLINIC | Age: 49
End: 2024-06-25

## 2024-06-25 DIAGNOSIS — J30.89 ENVIRONMENTAL AND SEASONAL ALLERGIES: Primary | ICD-10-CM

## 2024-06-25 PROCEDURE — 95165 ANTIGEN THERAPY SERVICES: CPT | Performed by: ALLERGY & IMMUNOLOGY

## 2024-06-25 PROCEDURE — 95117 IMMUNOTHERAPY INJECTIONS: CPT | Performed by: ALLERGY & IMMUNOLOGY

## 2024-06-27 ENCOUNTER — PATIENT MESSAGE (OUTPATIENT)
Dept: FAMILY MEDICINE CLINIC | Facility: CLINIC | Age: 49
End: 2024-06-27

## 2024-07-03 ENCOUNTER — NURSE ONLY (OUTPATIENT)
Dept: ALLERGY | Facility: CLINIC | Age: 49
End: 2024-07-03

## 2024-07-03 DIAGNOSIS — J30.89 ENVIRONMENTAL AND SEASONAL ALLERGIES: Primary | ICD-10-CM

## 2024-07-03 PROCEDURE — 95117 IMMUNOTHERAPY INJECTIONS: CPT | Performed by: INTERNAL MEDICINE

## 2024-07-09 ENCOUNTER — NURSE ONLY (OUTPATIENT)
Dept: ALLERGY | Facility: CLINIC | Age: 49
End: 2024-07-09

## 2024-07-09 DIAGNOSIS — J30.89 ENVIRONMENTAL AND SEASONAL ALLERGIES: Primary | ICD-10-CM

## 2024-07-09 PROCEDURE — 95117 IMMUNOTHERAPY INJECTIONS: CPT | Performed by: ALLERGY & IMMUNOLOGY

## 2024-07-16 ENCOUNTER — NURSE ONLY (OUTPATIENT)
Dept: ALLERGY | Facility: CLINIC | Age: 49
End: 2024-07-16

## 2024-07-16 DIAGNOSIS — J30.89 ENVIRONMENTAL AND SEASONAL ALLERGIES: Primary | ICD-10-CM

## 2024-07-16 PROCEDURE — 95117 IMMUNOTHERAPY INJECTIONS: CPT | Performed by: ALLERGY & IMMUNOLOGY

## 2024-07-16 NOTE — TELEPHONE ENCOUNTER
Received refill request for Singulair 10 mg- 1 tablet by mouth daily.    Last office visit was 11/28/23 for allergies and asthma. Per Dr. Lynch's progress notes to follow up in 6 months.

## 2024-07-18 NOTE — TELEPHONE ENCOUNTER
Attempted to call patient twice  Line rang out but no answer   Received refill request for Singulair 10 mg  Last office visit was 11/28/23 for allergies and asthma. Per last office visit note - follow-up in 6 months  Vibest message sent   Once patient schedules  follow-up, may send out refill

## 2024-07-19 DIAGNOSIS — Z22.7 TB LUNG, LATENT: ICD-10-CM

## 2024-07-22 NOTE — TELEPHONE ENCOUNTER
Suman Rdz,   Yes, you come see the nurses every week for your allergy injections.   You are due to follow up with Dr. Lynch for an office visit. He needs to follow up with you every six months and your last visit with him was in November.   We would like you to continue singulair medication. We just need you to schedule a follow up so we can send the refill.   We can help you schedule while you are in office if you would like, or you can call [phone].  Thank you!  ABBY Castillo

## 2024-07-23 ENCOUNTER — NURSE ONLY (OUTPATIENT)
Dept: ALLERGY | Facility: CLINIC | Age: 49
End: 2024-07-23

## 2024-07-23 DIAGNOSIS — J30.89 ENVIRONMENTAL AND SEASONAL ALLERGIES: Primary | ICD-10-CM

## 2024-07-23 PROCEDURE — 95117 IMMUNOTHERAPY INJECTIONS: CPT | Performed by: INTERNAL MEDICINE

## 2024-07-23 RX ORDER — MONTELUKAST SODIUM 10 MG/1
10 TABLET ORAL NIGHTLY
Qty: 90 TABLET | Refills: 0 | Status: SHIPPED | OUTPATIENT
Start: 2024-07-23

## 2024-07-23 RX ORDER — ISONIAZID 300 MG/1
300 TABLET ORAL DAILY
Qty: 90 TABLET | Refills: 3 | OUTPATIENT
Start: 2024-07-23

## 2024-07-23 NOTE — TELEPHONE ENCOUNTER
Follow up scheduled for November.   Refill sent per protocol. No further action needed at this time.

## 2024-07-23 NOTE — TELEPHONE ENCOUNTER
Please Review. Protocol Failed; No Protocol     Requested Prescriptions   Pending Prescriptions Disp Refills    isoniazid 300 MG Oral Tab 90 tablet 3     Sig: Take 1 tablet (300 mg total) by mouth daily.       There is no refill protocol information for this order            Future Appointments         Provider Department Appt Notes    Today [facility] 7/19 payment on balance due received “   “ no action needed at this time.    In 1 week [facility]           Recent Outpatient Visits              1 week ago Environmental and seasonal allergies [J30.89]    [facility]    Nurse Only    2 weeks ago Environmental and seasonal allergies [J30.89]    [facility]    Nurse Only    2 weeks ago Environmental and seasonal allergies    [facility]    Nurse Only    4 weeks ago Environmental and seasonal allergies    [facility]    Nurse Only    1 month ago Genital warts    [facility], Mis Hopkins MD    Telemedicine

## 2024-07-23 NOTE — TELEPHONE ENCOUNTER
Disp Refills Start End    isoniazid 300 MG Oral Tab 90 tablet 3 1/11/2024 --    Sig - Route: Take 1 tablet (300 mg total) by mouth daily. - Oral    Sent to pharmacy as: Isoniazid 300 MG Oral Tablet ([facility])    E-Prescribing Status: Receipt confirmed by pharmacy (1/11/2024  2:21 PM CST)      Associated Diagnoses    TB lung, latent        Pharmacy

## 2024-07-30 ENCOUNTER — NURSE ONLY (OUTPATIENT)
Dept: ALLERGY | Facility: CLINIC | Age: 49
End: 2024-07-30

## 2024-07-30 DIAGNOSIS — J30.89 ENVIRONMENTAL AND SEASONAL ALLERGIES: Primary | ICD-10-CM

## 2024-07-30 PROCEDURE — 95165 ANTIGEN THERAPY SERVICES: CPT | Performed by: ALLERGY & IMMUNOLOGY

## 2024-07-30 PROCEDURE — 95117 IMMUNOTHERAPY INJECTIONS: CPT | Performed by: ALLERGY & IMMUNOLOGY

## 2024-08-06 ENCOUNTER — NURSE ONLY (OUTPATIENT)
Dept: ALLERGY | Facility: CLINIC | Age: 49
End: 2024-08-06

## 2024-08-06 DIAGNOSIS — J30.89 ENVIRONMENTAL AND SEASONAL ALLERGIES: Primary | ICD-10-CM

## 2024-08-06 PROCEDURE — 95117 IMMUNOTHERAPY INJECTIONS: CPT | Performed by: ALLERGY & IMMUNOLOGY

## 2024-08-13 ENCOUNTER — NURSE ONLY (OUTPATIENT)
Dept: ALLERGY | Facility: CLINIC | Age: 49
End: 2024-08-13

## 2024-08-13 DIAGNOSIS — J30.89 ENVIRONMENTAL AND SEASONAL ALLERGIES: Primary | ICD-10-CM

## 2024-08-13 PROCEDURE — 95117 IMMUNOTHERAPY INJECTIONS: CPT | Performed by: ALLERGY & IMMUNOLOGY

## 2024-08-20 ENCOUNTER — TELEPHONE (OUTPATIENT)
Dept: ALLERGY | Facility: CLINIC | Age: 49
End: 2024-08-20

## 2024-08-20 ENCOUNTER — NURSE ONLY (OUTPATIENT)
Dept: ALLERGY | Facility: CLINIC | Age: 49
End: 2024-08-20

## 2024-08-20 DIAGNOSIS — J30.89 ENVIRONMENTAL AND SEASONAL ALLERGIES: Primary | ICD-10-CM

## 2024-08-20 PROCEDURE — 95117 IMMUNOTHERAPY INJECTIONS: CPT | Performed by: ALLERGY & IMMUNOLOGY

## 2024-08-20 NOTE — TELEPHONE ENCOUNTER
Dr. Lynch, please advise on any further suggestions regarding itchy eye treatment.     Patient is currently taking Xyzal 5 mg nightly and applying to eyes Olopatadine HCl 0.2 % Ophthalmic Solution and Systane eye drops 1 drop to each eye daily.      Patient instructed that she may place drops in the refrigerator to help cool and sooth the eyes.

## 2024-08-20 NOTE — TELEPHONE ENCOUNTER
Call reviewed and noted.  Agree with triage advice provided and decrease immunotherapy by 1 dose.

## 2024-08-20 NOTE — TELEPHONE ENCOUNTER
RN left voicemail with Dr. Lynch's advice/recommendations listed beow  Left call back number and office hours if patient had any additional questions/concerns

## 2024-08-20 NOTE — TELEPHONE ENCOUNTER
Call noted.  Agree with triage advice provided.  May increase Xyzal up to twice a day.  May also consider trial of Systane as an eye moisturizer if having any dry eyes as well.  In addition to the Pataday

## 2024-08-20 NOTE — TELEPHONE ENCOUNTER
MRI Lumbar order faxed to [facility] location in [place], fax# [phone].    Patient presents to Allergy Office for Allergy Immunotherapy Injections.     Patient reports for the last Allergy Injection of Blue 0.40 mL to Mites Cat and Dog she experienced itching and watering of eyes.     She offers that to the right arm after 8/13/2024 Allergy Injection (containing 0.40 mL Blue Cat and Dog) she experienced a large local reaction, but did not extend to axilla or antecubital creases).     Patient's Allergy Injection dose today reduced to last tolerated 0.35 mL Blue Mite, Cat, Dog.         Patient denies after 8/13/2024 Allergy Injections that she developed difficulty breathing, swallowing, developed nausea/diarrhea, developed increased nasal congestion or post nasal drip.       Patient is currently taking Xyzal 5 mg nightly and applying to eyes Olopatadine HCl 0.2 % Ophthalmic Solution and Systane eye drops 1 drop to each eye daily.     Patient instructed that she may place drops in the refrigerator to help cool and sooth the eyes.

## 2024-08-27 ENCOUNTER — NURSE ONLY (OUTPATIENT)
Dept: ALLERGY | Facility: CLINIC | Age: 49
End: 2024-08-27

## 2024-08-27 DIAGNOSIS — J30.89 ENVIRONMENTAL AND SEASONAL ALLERGIES: Primary | ICD-10-CM

## 2024-08-27 PROCEDURE — 95117 IMMUNOTHERAPY INJECTIONS: CPT | Performed by: ALLERGY & IMMUNOLOGY

## 2024-08-27 RX ORDER — MONTELUKAST SODIUM 10 MG/1
10 TABLET ORAL NIGHTLY
Qty: 90 TABLET | Refills: 0 | Status: SHIPPED | OUTPATIENT
Start: 2024-08-27

## 2024-08-27 RX ORDER — LEVOCETIRIZINE DIHYDROCHLORIDE 5 MG/1
5 TABLET, FILM COATED ORAL 2 TIMES DAILY
Qty: 180 TABLET | Refills: 1 | Status: SHIPPED | OUTPATIENT
Start: 2024-08-27

## 2024-09-03 ENCOUNTER — NURSE ONLY (OUTPATIENT)
Dept: ALLERGY | Facility: CLINIC | Age: 49
End: 2024-09-03

## 2024-09-03 DIAGNOSIS — J30.89 ENVIRONMENTAL AND SEASONAL ALLERGIES: Primary | ICD-10-CM

## 2024-09-03 PROCEDURE — 95165 ANTIGEN THERAPY SERVICES: CPT | Performed by: ALLERGY & IMMUNOLOGY

## 2024-09-03 PROCEDURE — 95117 IMMUNOTHERAPY INJECTIONS: CPT | Performed by: ALLERGY & IMMUNOLOGY

## 2024-09-10 ENCOUNTER — NURSE ONLY (OUTPATIENT)
Dept: ALLERGY | Facility: CLINIC | Age: 49
End: 2024-09-10

## 2024-09-10 DIAGNOSIS — J30.89 ENVIRONMENTAL AND SEASONAL ALLERGIES: Primary | ICD-10-CM

## 2024-09-10 PROCEDURE — 95117 IMMUNOTHERAPY INJECTIONS: CPT | Performed by: ALLERGY & IMMUNOLOGY

## 2024-09-17 ENCOUNTER — NURSE ONLY (OUTPATIENT)
Dept: ALLERGY | Facility: CLINIC | Age: 49
End: 2024-09-17

## 2024-09-17 DIAGNOSIS — J30.89 ENVIRONMENTAL AND SEASONAL ALLERGIES: Primary | ICD-10-CM

## 2024-09-17 PROCEDURE — 95117 IMMUNOTHERAPY INJECTIONS: CPT | Performed by: ALLERGY & IMMUNOLOGY

## 2024-09-26 RX ORDER — LEVOCETIRIZINE DIHYDROCHLORIDE 5 MG/1
5 TABLET, FILM COATED ORAL 2 TIMES DAILY
Qty: 180 TABLET | Refills: 0 | Status: SHIPPED | OUTPATIENT
Start: 2024-09-26

## 2024-09-26 NOTE — TELEPHONE ENCOUNTER
Refill requested for Kajal Sen [facility] Allergy Clinical Staff  Phone Number: [phone]     Refills have been requested for the following medications:        levocetirizine 5 MG Oral Tab [Hector Lynch]    Preferred pharmacy: [id number] - [place], [place] - [address] AT [facility], [phone], [phone]    Last office visit: 11/28/2023    Previously advised to follow up in Follow-up in 6 months or sooner if needed    F/U currently scheduled? 11/19/20204        ACTION: Refilled per protocol.

## 2024-10-01 ENCOUNTER — NURSE ONLY (OUTPATIENT)
Dept: ALLERGY | Facility: CLINIC | Age: 49
End: 2024-10-01

## 2024-10-01 DIAGNOSIS — J30.89 ENVIRONMENTAL AND SEASONAL ALLERGIES: Primary | ICD-10-CM

## 2024-10-01 PROCEDURE — 95117 IMMUNOTHERAPY INJECTIONS: CPT | Performed by: ALLERGY & IMMUNOLOGY

## 2024-10-08 ENCOUNTER — NURSE ONLY (OUTPATIENT)
Dept: ALLERGY | Facility: CLINIC | Age: 49
End: 2024-10-08

## 2024-10-08 DIAGNOSIS — J30.89 ENVIRONMENTAL AND SEASONAL ALLERGIES: Primary | ICD-10-CM

## 2024-10-08 PROCEDURE — 95117 IMMUNOTHERAPY INJECTIONS: CPT | Performed by: ALLERGY & IMMUNOLOGY

## 2024-10-08 PROCEDURE — 95165 ANTIGEN THERAPY SERVICES: CPT | Performed by: ALLERGY & IMMUNOLOGY

## 2024-10-15 ENCOUNTER — NURSE ONLY (OUTPATIENT)
Dept: ALLERGY | Facility: CLINIC | Age: 49
End: 2024-10-15

## 2024-10-15 DIAGNOSIS — J30.89 ENVIRONMENTAL AND SEASONAL ALLERGIES: Primary | ICD-10-CM

## 2024-10-15 PROCEDURE — 95117 IMMUNOTHERAPY INJECTIONS: CPT | Performed by: ALLERGY & IMMUNOLOGY

## 2024-10-24 DIAGNOSIS — I10 ESSENTIAL HYPERTENSION: ICD-10-CM

## 2024-10-25 ENCOUNTER — TELEMEDICINE (OUTPATIENT)
Dept: FAMILY MEDICINE CLINIC | Facility: CLINIC | Age: 49
End: 2024-10-25
Payer: COMMERCIAL

## 2024-10-25 DIAGNOSIS — D22.9 CHANGE IN MOLE: ICD-10-CM

## 2024-10-25 DIAGNOSIS — Z12.31 SCREENING MAMMOGRAM FOR BREAST CANCER: ICD-10-CM

## 2024-10-25 DIAGNOSIS — J30.81 CAT ALLERGIES: Primary | ICD-10-CM

## 2024-10-25 DIAGNOSIS — Z12.11 COLON CANCER SCREENING: ICD-10-CM

## 2024-10-25 DIAGNOSIS — Z01.419 WELL WOMAN EXAM: ICD-10-CM

## 2024-10-25 DIAGNOSIS — I10 ESSENTIAL HYPERTENSION: ICD-10-CM

## 2024-10-25 PROCEDURE — 99214 OFFICE O/P EST MOD 30 MIN: CPT | Performed by: FAMILY MEDICINE

## 2024-10-25 RX ORDER — AMLODIPINE BESYLATE 5 MG/1
5 TABLET ORAL DAILY
Qty: 90 TABLET | Refills: 3 | Status: SHIPPED | OUTPATIENT
Start: 2024-10-25

## 2024-10-25 NOTE — PROGRESS NOTES
Virtual Telephone Check-In    Kajal Benedict verbally consents to a Virtual/Telephone Check-In service on 10/25/24.    Patient understands and accepts financial responsibility for any deductible, co-insurance and/or co-pays associated with this service.    Duration of the service: 15 minutes  This telemedicine visit was conducted using live audio and video.     Summary of topics discussed:     Follow - up - needs allergy referrals, wants to continue allergy shots, they are helping.  She is due for some preventative screenings including colonoscopy and mammogram.  Has noticed a change in a mole and would like to see dermatology for an annual skin exam.  Also due for her Pap smear.  Requesting refill of amlodipine.  No side effects your blood pressure stable at home.    Physical Exam:  General: alert, speaking in full sentences, no acute distress  Lungs: respirations sound unlabored, no audible wheezing with speaking.  Neurologic: alert, oriented x3    Assessment and plan:    1. Cat allergies  Needs referral for further allergy shots  - Allergy Referral - In Network    2. Colon cancer screening  - Gastro Referral - [place] ([facility])    3. Screening mammogram for breast cancer  - [facility] ALHAJI 2D+3D SCREENING BILAT (CPT=77067/30062); Future    4. Change in mole  - DERM - INTERNAL    5. Well woman exam  - OBG Referral - [facility])    6. Essential hypertension  - Stable condition, continue present management.    - amLODIPine 5 MG Oral Tab; Take 1 tablet (5 mg total) by mouth daily.  Dispense: 90 tablet; Refill: 3      Advised to call back clinic if no improvement in symptoms. Red flags discussed to go to ER.     Mis Munoz MD

## 2024-10-28 RX ORDER — AMLODIPINE BESYLATE 5 MG/1
5 TABLET ORAL DAILY
Qty: 90 TABLET | Refills: 3 | OUTPATIENT
Start: 2024-10-28

## 2024-10-28 NOTE — TELEPHONE ENCOUNTER
Disp Refills Start End    amLODIPine 5 MG Oral Tab 90 tablet 3 10/25/2024 --    Sig - Route: Take 1 tablet (5 mg total) by mouth daily. - Oral    Sent to pharmacy as: amLODIPine Besylate 5 MG Oral Tablet (Norvasc)    E-Prescribing Status: Receipt confirmed by pharmacy (10/25/2024 12:48 PM CDT)      Associated Diagnoses    Essential hypertension        Pharmacy    [facility]ACY #[id number] - [place], [place] - [address] AT [facility], [phone],

## 2024-10-29 ENCOUNTER — NURSE ONLY (OUTPATIENT)
Dept: ALLERGY | Facility: CLINIC | Age: 49
End: 2024-10-29

## 2024-10-29 DIAGNOSIS — J30.89 ENVIRONMENTAL AND SEASONAL ALLERGIES: Primary | ICD-10-CM

## 2024-10-29 PROCEDURE — 95117 IMMUNOTHERAPY INJECTIONS: CPT | Performed by: ALLERGY & IMMUNOLOGY

## 2024-11-05 ENCOUNTER — NURSE ONLY (OUTPATIENT)
Dept: ALLERGY | Facility: CLINIC | Age: 49
End: 2024-11-05

## 2024-11-05 DIAGNOSIS — J30.89 ENVIRONMENTAL AND SEASONAL ALLERGIES: Primary | ICD-10-CM

## 2024-11-05 PROCEDURE — 95117 IMMUNOTHERAPY INJECTIONS: CPT | Performed by: ALLERGY & IMMUNOLOGY

## 2024-11-12 ENCOUNTER — NURSE ONLY (OUTPATIENT)
Dept: ALLERGY | Facility: CLINIC | Age: 49
End: 2024-11-12

## 2024-11-12 DIAGNOSIS — J30.89 ENVIRONMENTAL AND SEASONAL ALLERGIES: Primary | ICD-10-CM

## 2024-11-12 PROCEDURE — 95165 ANTIGEN THERAPY SERVICES: CPT | Performed by: ALLERGY & IMMUNOLOGY

## 2024-11-12 PROCEDURE — 95117 IMMUNOTHERAPY INJECTIONS: CPT | Performed by: ALLERGY & IMMUNOLOGY

## 2024-11-19 ENCOUNTER — OFFICE VISIT (OUTPATIENT)
Dept: ALLERGY | Facility: CLINIC | Age: 49
End: 2024-11-19

## 2024-11-19 ENCOUNTER — NURSE ONLY (OUTPATIENT)
Dept: ALLERGY | Facility: CLINIC | Age: 49
End: 2024-11-19

## 2024-11-19 DIAGNOSIS — Z23 FLU VACCINE NEED: ICD-10-CM

## 2024-11-19 DIAGNOSIS — J30.89 ENVIRONMENTAL AND SEASONAL ALLERGIES: Primary | ICD-10-CM

## 2024-11-19 DIAGNOSIS — J30.89 SEASONAL AND PERENNIAL ALLERGIC RHINOCONJUNCTIVITIS: Primary | ICD-10-CM

## 2024-11-19 DIAGNOSIS — H10.10 SEASONAL AND PERENNIAL ALLERGIC RHINOCONJUNCTIVITIS: Primary | ICD-10-CM

## 2024-11-19 DIAGNOSIS — J45.20 MILD INTERMITTENT REACTIVE AIRWAY DISEASE WITHOUT COMPLICATION (HCC): ICD-10-CM

## 2024-11-19 DIAGNOSIS — J30.2 SEASONAL AND PERENNIAL ALLERGIC RHINOCONJUNCTIVITIS: Primary | ICD-10-CM

## 2024-11-19 DIAGNOSIS — Z23 NEED FOR COVID-19 VACCINE: ICD-10-CM

## 2024-11-19 PROCEDURE — 95117 IMMUNOTHERAPY INJECTIONS: CPT | Performed by: ALLERGY & IMMUNOLOGY

## 2024-11-19 PROCEDURE — 99214 OFFICE O/P EST MOD 30 MIN: CPT | Performed by: ALLERGY & IMMUNOLOGY

## 2024-11-19 PROCEDURE — 95165 ANTIGEN THERAPY SERVICES: CPT | Performed by: ALLERGY & IMMUNOLOGY

## 2024-11-19 RX ORDER — ALBUTEROL SULFATE 90 UG/1
2 INHALANT RESPIRATORY (INHALATION) EVERY 6 HOURS PRN
Qty: 1 EACH | Refills: 0 | Status: SHIPPED | OUTPATIENT
Start: 2024-11-19

## 2024-11-19 NOTE — PATIENT INSTRUCTIONS
1.  Allergic rhinitis  Patient approaching maintenance dose immunotherapy in the near future.  Continue with current medications.  Reviewed avoidance measures.  Medications include Xyzal Singulair with Pataday as needed    2.Reactive airway disease  Mild intermittent.  No ED visits or prednisone in the interim.  Albuterol 2 puffs every 4-6 hours if having active coughing wheezing or shortness of breath    #3 flu vaccine recommended and offered  Patient defers    4.  COVID-vaccine booster recommended.  Please check with local pharmacy as we do not stock the vaccine in office.  Most recent booster is in September 2024    #5 colonoscopy recommended.  No prior colonoscopy.  Reviewed screening starts at 45 years of age.  Recommend to touch base with PCP for referral  Immunotherapy in office today followed by 30 minutes of observation without issue or incident

## 2024-11-19 NOTE — PROGRESS NOTES
Kajal Benedict is a 48 year old female.    HPI:     Chief Complaint   Patient presents with    Allergies     Pt. Presents for a yearly follow up on seasonal/ environmental allergies. Pt. States that she continues with watery eyes but not as before.      Patient is a 48-year-old female who presents for follow-up with chief complaint of allergies  Patient last seen by me in 2023 for allergic rhinitis and reactive airway disease  Prior serum IgE testing to environmental allergens showed to cat more so than dog and dust mites.  Prior skin testing positive to dust mites  Medication list include Xyzal Singulair Pataday albuterol    Immunizations reviewed.  COVID-vaccine x 1 dose in .  No flu vaccine on record    Today patient reports    Ar:  Active or persistent symptoms: watery eyes  has lessened   Active meds: xyzal singulair , pataday prn    pets   cat   Ait helping   No issues with ait    Asthma  Active or persistent symptoms > 2 days per   week:: denies  ED visits or prednisone in the interim: denies   Active meds: alb prn     Defers flu vaccine     HISTORY:  Past Medical History:    Essential hypertension      Past Surgical History:   Procedure Laterality Date      2006    Lasik Bilateral 2008    done in [facility]    Other surgical history  2017    endometrium    Other surgical history      breast augmentation    Other surgical history  2018    nose    Other surgical history  , 2015    ovary sx      Family History   Problem Relation Age of Onset    Hypertension Mother     Hypertension Maternal Grandfather     Macular degeneration Neg     Diabetes Neg     Glaucoma Neg       Social History:   Social History     Socioeconomic History    Marital status: Single   Tobacco Use    Smoking status: Former     Current packs/day: 0.00     Types: Cigarettes     Quit date: 2006     Years since quittin.9     Passive exposure: Never    Smokeless tobacco: Never   Vaping Use    Vaping  status: Never Used   Substance and Sexual Activity    Alcohol use: Yes    Drug use: Never   Other Topics Concern    Reaction to local anesthetic No    Pt has a pacemaker No    Pt has a defibrillator No        Medications (Active prior to today's visit):  Current Outpatient Medications   Medication Sig Dispense Refill    amLODIPine 5 MG Oral Tab Take 1 tablet (5 mg total) by mouth daily. 90 tablet 3    levocetirizine 5 MG Oral Tab Take 1 tablet (5 mg total) by mouth in the morning and 1 tablet (5 mg total) before bedtime. 180 tablet 0    montelukast 10 MG Oral Tab Take 1 tablet (10 mg total) by mouth nightly. Patient requires a follow-up appointment with Dr. Lynch in order to receive further refills 90 tablet 0    LEVOCETIRIZINE 5 MG Oral Tab TAKE 1 TABLET BY MOUTH EVERY DAY IN THE EVENING 30 tablet 5    Olopatadine HCl 0.2 % Ophthalmic Solution Apply 1 drop to eye daily. 2.5 mL 3    isoniazid 300 MG Oral Tab Take 1 tablet (300 mg total) by mouth daily. 90 tablet 3    pyridoxine 50 MG Oral Tab Take 1 tablet (50 mg total) by mouth daily. 90 tablet 3    albuterol (PROAIR HFA) 108 (90 Base) MCG/ACT Inhalation Aero Soln Inhale 2 puffs into the lungs every 6 (six) hours as needed for Wheezing. 1 each 0    albuterol 108 (90 Base) MCG/ACT Inhalation Aero Soln Inhale 2 puffs into the lungs every 6 (six) hours as needed for Wheezing. 1 each 3    hydrocortisone 2.5 % External Ointment Applly bid to involved areas as needed (Patient not taking: Reported on 11/19/2024) 56 g 0       Allergies:  Allergies[1]      ROS:   Allergic/Immuno:  See hpi  Cardiovascular:  Negative for irregular heartbeat/palpitations, chest pain, edema  Constitutional:  Negative night sweats,weight loss, irritability and lethargy  ENMT:  Negative for ear drainage, hearing loss and nasal drainage  Eyes:  Negative for eye discharge and vision loss  Gastrointestinal:  Negative for abdominal pain, diarrhea and vomiting  Integumentary:  Negative for pruritus  and rash  Respiratory:  Negative for cough, dyspnea and wheezing    PHYSICAL EXAM:   Constitutional: responsive, no acute distress noted  Head/Face: NC/Atraumatic  Eyes/Vision: conjunctiva and lids are normal extraocular motion is intact   Ears/Audiometry: tympanic membranes are normal bilaterally hearing is grossly intact  Nose/Mouth/Throat: nose and throat are clear mucous membranes are moist   Neck/Thyroid: neck is supple without adenopathy  Lymphatic: no abnormal cervical, supraclavicular or axillary adenopathy is noted  Respiratory: normal to inspection lungs are clear to auscultation bilaterally normal respiratory effort   Cardiovascular: regular rate and rhythm no murmurs, gallups, or rubs  Abdomen: soft non-tender non-distended  Skin/Hair: no unusual rashes present   Extremities: no edema, cyanosis, or clubbing     ASSESSMENT/PLAN:   Assessment   Encounter Diagnoses   Name Primary?    Seasonal and perennial allergic rhinoconjunctivitis Yes    Mild intermittent reactive airway disease without complication (HCC)     Flu vaccine need     Need for COVID-19 vaccine      Unable to perform spirometry as machine is not functioning    1.  Allergic rhinitis  Patient approaching maintenance dose immunotherapy in the near future.  Continue with current medications.  Reviewed avoidance measures.  Medications include Xyzal Singulair with Pataday as needed    2.Reactive airway disease  Mild intermittent.  No ED visits or prednisone in the interim.  Albuterol 2 puffs every 4-6 hours if having active coughing wheezing or shortness of breath    #3 flu vaccine recommended and offered  Patient defers    4.  COVID-vaccine booster recommended.  Please check with local pharmacy as we do not stock the vaccine in office.  Most recent booster is in September 2024    #5 colonoscopy recommended.  No prior colonoscopy.  Reviewed screening starts at 45 years of age.  Recommend to touch base with PCP for referral    Immunotherapy in  office today followed by 30 minutes of observation without issue or incident     Orders This Visit:  No orders of the defined types were placed in this encounter.      Meds This Visit:  Requested Prescriptions      No prescriptions requested or ordered in this encounter       Imaging & Referrals:  None     11/19/2024  Hector Lynch MD    If medication samples were provided today, they were provided solely for patient education and training related to self administration of these medications.  Teaching, instruction and sample was provided to the patient by myself.  Teaching included  a review of potential adverse side effects as well as potential efficacy.  Patient's questions were answered in regards to medication administration and dosing and potential side effects. Teaching was provided via the teach back method           [1]   Allergies  Allergen Reactions    Blood-Group Specific Substance FACE FLUSHING, SHORTNESS OF BREATH and TONGUE SWELLING    Dust Coughing, ITCHING and WHEEZING

## 2024-12-03 ENCOUNTER — NURSE ONLY (OUTPATIENT)
Dept: ALLERGY | Facility: CLINIC | Age: 49
End: 2024-12-03

## 2024-12-03 ENCOUNTER — TELEPHONE (OUTPATIENT)
Dept: ALLERGY | Facility: CLINIC | Age: 49
End: 2024-12-03

## 2024-12-03 DIAGNOSIS — J30.89 ENVIRONMENTAL AND SEASONAL ALLERGIES: Primary | ICD-10-CM

## 2024-12-03 PROCEDURE — 95117 IMMUNOTHERAPY INJECTIONS: CPT | Performed by: ALLERGY & IMMUNOLOGY

## 2024-12-03 NOTE — TELEPHONE ENCOUNTER
Patient in office today for allergy injections   Per patient forgot to ask during last office visit on 11/19/24 for the letter from Dr. Lynch     Per patient is requesting a letter for work from Dr. Lynch if possible  Patient has a dust allergy and works in IT   Sometimes patient is transferred to older departments and has to work with old equipment that flares her allergies  Is asking if Dr. Lynch can write a letter stating she has a dust allergy and can be excused from going to areas known to flare symptoms?    RN advised will forward message to Dr. Lynch for further advice/recommendations

## 2024-12-16 ENCOUNTER — NURSE ONLY (OUTPATIENT)
Dept: ALLERGY | Facility: CLINIC | Age: 49
End: 2024-12-16

## 2024-12-16 DIAGNOSIS — J30.89 ENVIRONMENTAL AND SEASONAL ALLERGIES: Primary | ICD-10-CM

## 2024-12-18 ENCOUNTER — HOSPITAL ENCOUNTER (OUTPATIENT)
Dept: MAMMOGRAPHY | Age: 49
Discharge: HOME | End: 2024-12-18
Attending: FAMILY MEDICINE
Payer: COMMERCIAL

## 2024-12-18 DIAGNOSIS — Z12.31 SCREENING MAMMOGRAM FOR BREAST CANCER: ICD-10-CM

## 2024-12-18 PROCEDURE — 77067 SCR MAMMO BI INCL CAD: CPT | Performed by: FAMILY MEDICINE

## 2024-12-18 PROCEDURE — 77063 BREAST TOMOSYNTHESIS BI: CPT | Performed by: FAMILY MEDICINE

## 2024-12-30 ENCOUNTER — NURSE ONLY (OUTPATIENT)
Dept: ALLERGY | Facility: CLINIC | Age: 49
End: 2024-12-30

## 2024-12-30 DIAGNOSIS — J30.89 ENVIRONMENTAL AND SEASONAL ALLERGIES: Primary | ICD-10-CM

## 2025-01-23 ENCOUNTER — TELEPHONE (OUTPATIENT)
Dept: FAMILY MEDICINE CLINIC | Facility: CLINIC | Age: 50
End: 2025-01-23

## 2025-01-23 NOTE — TELEPHONE ENCOUNTER
Patient arrived in [facility] office requesting that her referral for Dr. Lynch be renewed for this year, stating that she changed insurances and that the new insurance will not cover her allergy injections or office visits with his practice without a new referral.     Patient also states she will need a new referral for Gynecology for the same reason.    Patient's new insurance has been uploaded to her chart, please advise.

## 2025-01-24 ENCOUNTER — OFFICE VISIT (OUTPATIENT)
Dept: FAMILY MEDICINE CLINIC | Facility: CLINIC | Age: 50
End: 2025-01-24

## 2025-01-24 DIAGNOSIS — M79.604 RIGHT LEG PAIN: ICD-10-CM

## 2025-01-24 DIAGNOSIS — I83.90 VARICOSE VEIN: ICD-10-CM

## 2025-01-24 DIAGNOSIS — Z00.00 ENCOUNTER FOR ANNUAL HEALTH EXAMINATION: ICD-10-CM

## 2025-01-24 DIAGNOSIS — Z12.11 COLON CANCER SCREENING: ICD-10-CM

## 2025-01-24 DIAGNOSIS — I10 ESSENTIAL HYPERTENSION: Primary | ICD-10-CM

## 2025-01-24 DIAGNOSIS — J30.81 CAT ALLERGIES: ICD-10-CM

## 2025-01-24 PROCEDURE — 99214 OFFICE O/P EST MOD 30 MIN: CPT | Performed by: FAMILY MEDICINE

## 2025-01-24 NOTE — PROGRESS NOTES
Chief Complaint   Patient presents with    Leg Pain     HPI:   Kajal Benedict is a 49 year old female who presents to clinic for follow up on spider veins/ varicose veins affecting both legs.   Where she has been working for 3 years she has been working in a standing position.   Has a h/o fracture in her R foot affecting 2nd toe.   Can't wear heels bc when standing for long periods it causes pain in the RLE.   Requesting work note to limit time standing to ease her pain.     Bp elevated in office today but asymptomatic. Currently taking amlodipine.     Wants to continue with allergy shots but d/t ins change has been having issues.     REVIEW OF SYSTEMS:   Negative, except per HPI.     EXAM:   BP (!) 146/91   Pulse 78   Wt 161 lb (73 kg)   LMP  (LMP Unknown)   BMI 25.99 kg/m²   Body mass index is 25.99 kg/m².  GENERAL: well developed, well nourished, in no apparent distress  SKIN: no rashes, no suspicious lesions  HEENT: atraumatic, normocephalic  EYES: PERRLA, EOMI,conjunctiva are clear  NECK: supple, no adenopathy  LUNGS: clear to auscultation  CARDIO: RRR without murmur    ASSESSMENT AND PLAN:     1. Essential hypertension  - Stable condition, continue present management.    Monitor bp at home    2. Cat allergies  Requesting renewed referral d/t ins change. Needs to follow up with allergist for insurance approval of allergy shots.   - Allergy Referral - In Network    3. Encounter for annual health examination  - OBG Referral - [place] ([facility])    4. Right leg pain  Requesting referral to rheum (request made after I left the visit)  - Rheumatology Referral - [facility])    5. Colon cancer screening  - COLOGUARD COLON CANCER SCREENING (EXTERNAL)    6. Varicose Vein  Referral to Dr. Bryant given.      RTC if no improvement in symptoms. Red flags discussed to go to ER.     Mis Munoz MD  1/24/2025  3:49 PM

## 2025-01-26 ENCOUNTER — PATIENT MESSAGE (OUTPATIENT)
Dept: ALLERGY | Facility: CLINIC | Age: 50
End: 2025-01-26

## 2025-01-27 VITALS
BODY MASS INDEX: 26 KG/M2 | WEIGHT: 161 LBS | DIASTOLIC BLOOD PRESSURE: 88 MMHG | HEART RATE: 78 BPM | SYSTOLIC BLOOD PRESSURE: 138 MMHG

## 2025-01-28 ENCOUNTER — NURSE ONLY (OUTPATIENT)
Dept: ALLERGY | Facility: CLINIC | Age: 50
End: 2025-01-28

## 2025-01-28 DIAGNOSIS — J30.89 ENVIRONMENTAL AND SEASONAL ALLERGIES: Primary | ICD-10-CM

## 2025-01-28 PROCEDURE — 95165 ANTIGEN THERAPY SERVICES: CPT | Performed by: ALLERGY & IMMUNOLOGY

## 2025-01-28 PROCEDURE — 95117 IMMUNOTHERAPY INJECTIONS: CPT | Performed by: ALLERGY & IMMUNOLOGY

## 2025-01-28 NOTE — TELEPHONE ENCOUNTER
Pt presented to office today for injections.    While pt was in office, RN discussed with pt that she will need to have her PCP place additional referrals for Dr. Lynch/Allergy for future visits.  It appears that her PCP only placed one referral which she will be using up today while she receives allergy shots.     Pt is aware that Dr. Lynch cannot place his own referrals to see him/his allergy office and that pt's PCP must place referrals.    Pt will contact her PCP for additional referrals.

## 2025-02-11 ENCOUNTER — NURSE ONLY (OUTPATIENT)
Dept: ALLERGY | Facility: CLINIC | Age: 50
End: 2025-02-11

## 2025-02-11 DIAGNOSIS — J30.89 ENVIRONMENTAL AND SEASONAL ALLERGIES: Primary | ICD-10-CM

## 2025-02-11 PROCEDURE — 95117 IMMUNOTHERAPY INJECTIONS: CPT | Performed by: ALLERGY & IMMUNOLOGY

## 2025-02-18 ENCOUNTER — OFFICE VISIT (OUTPATIENT)
Dept: OBGYN CLINIC | Facility: CLINIC | Age: 50
End: 2025-02-18

## 2025-02-18 VITALS
HEART RATE: 89 BPM | SYSTOLIC BLOOD PRESSURE: 148 MMHG | DIASTOLIC BLOOD PRESSURE: 89 MMHG | BODY MASS INDEX: 26 KG/M2 | WEIGHT: 163 LBS

## 2025-02-18 DIAGNOSIS — N39.3 SUI (STRESS URINARY INCONTINENCE, FEMALE): ICD-10-CM

## 2025-02-18 DIAGNOSIS — N90.89 VULVAR LESION: Primary | ICD-10-CM

## 2025-02-18 PROCEDURE — 99214 OFFICE O/P EST MOD 30 MIN: CPT | Performed by: STUDENT IN AN ORGANIZED HEALTH CARE EDUCATION/TRAINING PROGRAM

## 2025-02-18 RX ORDER — CLOBETASOL PROPIONATE 0.5 MG/G
0.5 OINTMENT TOPICAL AS DIRECTED
Qty: 45 G | Refills: 3 | Status: SHIPPED | OUTPATIENT
Start: 2025-02-18

## 2025-02-18 NOTE — PROGRESS NOTES
[facility]  Obstetrics and Gynecology  Focused Gynecology Problem Exam      Kajal Benedict is a 49 year old female presenting for Vaginal Problem (NP, wants to get tested if she has HPV, noticed \"marks\" on vaginal area )  .    HPI:     Chief Complaint   Patient presents with    Vaginal Problem     NP, wants to get tested if she has HPV, noticed \"marks\" on vaginal area      Noticed some vesicles around labia and unsure if could be HPV  Nervous, tried otc remedies with vinegar  Noticed this a few months ago    Denies pain or bleeding or burning pain    Last pap 6/2023: nil, hpv neg    Has been 3yrs not sexually active  Sometimes will shave vulvar area    Last menses was a year ago  No PMB since    Menarche: 12 (12/3/2024  4:26 PM)  Period Cycle (Days):  (12/3/2024  4:26 PM)  Use of Birth Control (if yes, specify type): Postmenopausal (12/3/2024  4:26 PM)  Hx Prior Abnormal Pap: No (12/3/2024  4:26 PM)  Pap Date: 06/06/23 (12/3/2024  4:26 PM)  Pap Result Notes: negative (12/3/2024  4:26 PM)      Medications (Active prior to today's visit):  Current Outpatient Medications   Medication Sig Dispense Refill    clobetasol 0.05 % External Ointment Apply 0.5 g topically As Directed. Apply small amount to affected area twice a day for 2 weeks,  then once a day for 2 weeks, then once a week. 45 g 3    albuterol (VENTOLIN HFA) 108 (90 Base) MCG/ACT Inhalation Aero Soln Inhale 2 puffs into the lungs every 6 (six) hours as needed for Wheezing. 1 each 0    amLODIPine 5 MG Oral Tab Take 1 tablet (5 mg total) by mouth daily. 90 tablet 3    montelukast 10 MG Oral Tab Take 1 tablet (10 mg total) by mouth nightly. Patient requires a follow-up appointment with Dr. Lynch in order to receive further refills 90 tablet 0    LEVOCETIRIZINE 5 MG Oral Tab TAKE 1 TABLET BY MOUTH EVERY DAY IN THE EVENING 30 tablet 5    Olopatadine HCl 0.2 % Ophthalmic Solution Apply 1 drop to eye daily. 2.5 mL 3    hydrocortisone 2.5 % External Ointment  Applly bid to involved areas as needed 56 g 0    albuterol (PROAIR HFA) 108 (90 Base) MCG/ACT Inhalation Aero Soln Inhale 2 puffs into the lungs every 6 (six) hours as needed for Wheezing. 1 each 0    isoniazid 300 MG Oral Tab Take 1 tablet (300 mg total) by mouth daily. (Patient not taking: Reported on 2025) 90 tablet 3    pyridoxine 50 MG Oral Tab Take 1 tablet (50 mg total) by mouth daily. (Patient not taking: Reported on 2025) 90 tablet 3     Allergies:  Allergies[1]  HISTORY:     OB History    Para Term  AB Living   2 1     1 1   SAB IAB Ectopic Multiple Live Births   1       1      # Outcome Date GA Lbr Alexandr/2nd Weight Sex Type Anes PTL Lv   2 Para 06    M CS-Unspec None  BENJAMIN   1 SAB                  Past Medical History:    Essential hypertension       Past Surgical History:   Procedure Laterality Date          Lasik Bilateral 2008    done in [facility]    Other surgical history      endometrium    Other surgical history      breast augmentation    Other surgical history  2018    nose    Other surgical history  ,     ovary sx       Family History   Problem Relation Age of Onset    Hypertension Mother     Hypertension Maternal Grandfather     Macular degeneration Neg     Diabetes Neg     Glaucoma Neg        Social History     Socioeconomic History    Marital status: Single     Spouse name: Not on file    Number of children: Not on file    Years of education: Not on file    Highest education level: Not on file   Occupational History    Not on file   Tobacco Use    Smoking status: Former     Current packs/day: 0.00     Types: Cigarettes     Quit date: 2006     Years since quittin.1     Passive exposure: Never    Smokeless tobacco: Never   Vaping Use    Vaping status: Never Used   Substance and Sexual Activity    Alcohol use: Not Currently    Drug use: Never    Sexual activity: Not on file   Other Topics Concern    Grew up on a farm Not Asked     History of tanning Not Asked    Outdoor occupation Not Asked    Breast feeding Not Asked    Reaction to local anesthetic No    Pt has a pacemaker No    Pt has a defibrillator No   Social History Narrative    Not on file     Social Drivers of Health     Food Insecurity: Not on file   Transportation Needs: Not on file   Stress: Not on file   Housing Stability: Not on file       ROS:   Review of Systems:    Constitutional:    denies fever / chills  Eyes:     denies blurred or double vision  Cardiovascular:  denies chest pain or palpitations  Respiratory:    denies shortness of breath  Gastrointestinal:  denies severe abdominal pain, frequent diarrhea or constipation, nausea / vomiting  Genitourinary:    denies dysuria, bothersome incontinence  Skin/Breast:   denies any breast pain, lumps, or discharge  Neurological:    denies frequent severe headaches  Psychiatric:   denies depression or anxiety, thoughts of harming self or others  Heme/Lymph:    denies easy bruising or bleeding  PHYSICAL EXAM:   /89   Pulse 89   Wt 163 lb (73.9 kg)   LMP  (LMP Unknown)   BMI 26.31 kg/m²     GENERAL: well developed, well nourished, in no apparent distress  ABDOMEN: Soft, non distended; non tender, no masses  GYNE/: External Genitalia: Normal appearing, no lesions. Urethral meatus appear wnl, no abnormal discharge or lesions noted.          Bladder: well supported, urethra wnl, no lesions or fissures                     Vagina: normal pink mucosa, over left labia minora at the 5 o'clock position a cluster of vesicles/papules noted, nontender, no drainage noted, normal clear discharge.                      Uterus: anteverted, mobile, non tender, normal size                     Cervix: Normal                      Adnexa: non tender, no masses, normal size     ASSESSMENT:    Reviewed exam findings, don't suspect infectious etiology but HSV swab taken,   Recommend vulvar biopsy but pt declines at this time.  Will do trial of  clobetasol   Rtc in one month, if persistent lesions and neg cx, recommend vulvar biopsy      ICD-10-CM    1. Vulvar lesion  N90.89 clobetasol 0.05 % External Ointment     HSV 1/2 Subtype by PCR (Lesion-Only)     CANCELED: HSV 1/2 Subtype by PCR (Lesion-Only)      2. THANG (stress urinary incontinence, female)  N39.3 Pelvic Floor Therapy - [place] Location          PLAN:   Rtc in one month  Vulvar care and hygiene reviewed.    ORDERS:     Orders Placed This Encounter   Procedures    HSV 1/2 Subtype by PCR (Lesion-Only)     PRESCRIPTIONS:     Requested Prescriptions     Signed Prescriptions Disp Refills    clobetasol 0.05 % External Ointment 45 g 3     Sig: Apply 0.5 g topically As Directed. Apply small amount to affected area twice a day for 2 weeks,  then once a day for 2 weeks, then once a week.     IMAGING/ REFERRALS:    PELVIC FLOOR THERAPY - INTERNAL     Lucero Bowman MD  2/18/2025  4:34 PM               [1]   Allergies  Allergen Reactions    Blood-Group Specific Substance FACE FLUSHING, SHORTNESS OF BREATH and TONGUE SWELLING    Dust Coughing, ITCHING and WHEEZING

## 2025-02-19 NOTE — PATIENT INSTRUCTIONS
Vulvar Care     Cleaning:  -use plain warm (not hot) water (no soap) to wash if needed or can take Sitz bath (see below)  -use water, fingers, & only very gentle, fragrance-free, moisturizing cleanser      (e.g. Cetaphil or CeraVe hydrating or gentle cleansers meant for eczema/psoriasis & faces)  -only pat dry gently (no rubbing)     Sitz baths:  -soothing and cleansing  -Get a Sitz Bath from [facility] or [facility]--fits over toilet, you can fill with water to soak vulva without having to get in bathtub  -Use 1-3 times per day for ten minutes at a time  -Pat dry, apply thin layer of vaseline to protect the skin     Protection/Prevention:  -goal is to maintain an intact, moist (non-dehydrated) skin barrier  -need to prevent irritation & over-drying of skin that can lead to micro-tears, cracking, and itching, pain, & bleeding.  -do not scratch or wipe hard (mechanical injury)  -avoid strong chemicals/fragrances (fragrance free soap & detergents, avoid fabric softeners)  -avoid other irritants (e.g. sweat, leaked urine, leaked stool)  -avoid excessive friction (no thongs, always use lubricant for intercourse, daily barrier cream or ointment)  -breathable underwear, change underwear if sweaty/wet, no underwear while sleeping if possible.  -DO USE a barrier product for protection       (e.g. Aquaphor, vaseline, A&D ointment, Zinc oxide, diaper rash cream) at least once daily  -DO NOT use any instrument (including fingers) in the anus first and then in the vagina. This will cause a bacterial infection of the vagina.     Treatment:  -BEST TREATMENT IS PREVENTION  -ointments are generally more potent than creams  -use just a thin layer  -during treatment (usually at least 2 wk) it is best to avoid intercourse to avoid trauma to the skin  -if diagnosed with chronic inflammatory skin condition (e.g. lichen sclerosis)         Most important is avoiding scratching & irritants         Work with gynecologist to form a steroid  regimen for long term use         Often will need daily strong topical steroid (prescription) for at least 12 weeks.           Best to try to taper down to 2-3 times per week or weekly if able & can increase use in a flare  -if sensitive/dry skin         Add back some oils &/or moisture on a regular basis         Coconut oil is pure (no irritants) & contains ceramides (fatty acids) that are             important for maintaining skin barrier         Hyaluronic acid (there are suppositories for intravaginal use e.g Revaree)         Vaginal moisturizer products can be used topically as well (e.g. Replens, Luvena)         Still recommend a barrier product in addition (on top of the above)     If you are tempted to scratch:  -place ice pack on area for 15-20 minutes & distract yourself.  -if needed can (sparingly) place a thin layer of lidocaine ointment or cream        (e.g. Bikini-zone, etc over the counter)  -can take an oral anti-histamine (e.g. Benadryl, Claritin, Zyrtec, etc)  -can also use a topical steroid (hydrocortisone ointment over the counter) for a few days        Take care - chronic steroid use can cause skin thinning & can worsen your problem.

## 2025-02-20 ENCOUNTER — TELEPHONE (OUTPATIENT)
Dept: OBGYN CLINIC | Facility: CLINIC | Age: 50
End: 2025-02-20

## 2025-02-20 LAB
HSV 1 NAA: NEGATIVE
HSV 1 NAA: NEGATIVE
HSV 2 NAA: NEGATIVE
HSV 2 NAA: NEGATIVE

## 2025-03-31 RX ORDER — MONTELUKAST SODIUM 10 MG/1
10 TABLET ORAL NIGHTLY
Qty: 90 TABLET | Refills: 0 | Status: SHIPPED | OUTPATIENT
Start: 2025-03-31

## 2025-03-31 NOTE — TELEPHONE ENCOUNTER
Refill requested for     Requested Renewals     Name from pharmacy: MONTELUKAST SOD 10 MG TABLET         Will file in chart as: MONTELUKAST 10 MG Oral Tab    Sig: Take 1 tablet (10 mg total) by mouth nightly. Patient requires a follow-up appointment with Dr. Lynch in order to receive further refills    Disp: 90 tablet    Refills: 0 (Pharmacy requested: Not specified)    Start: 3/31/2025    Class: Normal    Non-formulary    Last ordered: 7 months ago (8/27/2024) by Hector Lynch MD    Last refill: 12/24/2024    Rx #: [id number]    Corticosteroids / Long-Acting Bronchodilators Passed 03/31/2025 12:11 AM   Protocol Details Appt in past 6 mos or next 3 mos    Medication is active on med list      To be filled at: [facility]/pharmacy #[id number] - [place], [place] - [address] AT [facility], [phone], [phone]          Last office visit: 11/19/24    Previously advised to follow up in- 6-12 months     F/U currently scheduled? Not at this time     Date of last refill:  8/27/24    ACTION: Refilled per protocol.

## 2025-04-01 ENCOUNTER — NURSE ONLY (OUTPATIENT)
Dept: ALLERGY | Facility: CLINIC | Age: 50
End: 2025-04-01

## 2025-04-01 DIAGNOSIS — J30.89 ENVIRONMENTAL AND SEASONAL ALLERGIES: Primary | ICD-10-CM

## 2025-04-01 PROCEDURE — 95117 IMMUNOTHERAPY INJECTIONS: CPT | Performed by: ALLERGY & IMMUNOLOGY

## 2025-04-15 ENCOUNTER — NURSE ONLY (OUTPATIENT)
Dept: ALLERGY | Facility: CLINIC | Age: 50
End: 2025-04-15

## 2025-04-15 DIAGNOSIS — J30.89 ENVIRONMENTAL AND SEASONAL ALLERGIES: Primary | ICD-10-CM

## 2025-04-15 PROCEDURE — 95117 IMMUNOTHERAPY INJECTIONS: CPT | Performed by: ALLERGY & IMMUNOLOGY

## 2025-04-15 PROCEDURE — 95165 ANTIGEN THERAPY SERVICES: CPT | Performed by: ALLERGY & IMMUNOLOGY

## 2025-04-29 ENCOUNTER — NURSE ONLY (OUTPATIENT)
Dept: ALLERGY | Facility: CLINIC | Age: 50
End: 2025-04-29

## 2025-04-29 DIAGNOSIS — J30.89 ENVIRONMENTAL AND SEASONAL ALLERGIES: Primary | ICD-10-CM

## 2025-04-29 PROCEDURE — 95165 ANTIGEN THERAPY SERVICES: CPT | Performed by: ALLERGY & IMMUNOLOGY

## 2025-04-29 PROCEDURE — 95117 IMMUNOTHERAPY INJECTIONS: CPT | Performed by: ALLERGY & IMMUNOLOGY

## 2025-05-14 ENCOUNTER — NURSE ONLY (OUTPATIENT)
Dept: ALLERGY | Facility: CLINIC | Age: 50
End: 2025-05-14

## 2025-05-14 DIAGNOSIS — Z91.09 ENVIRONMENTAL ALLERGIES: Primary | ICD-10-CM

## 2025-05-14 PROCEDURE — 95125 IMMUNOTHERAPY 2/> INJECTIONS: CPT | Performed by: ALLERGY & IMMUNOLOGY

## 2025-05-14 RX ORDER — MONTELUKAST SODIUM 10 MG/1
10 TABLET ORAL NIGHTLY
Qty: 90 TABLET | Refills: 0 | Status: SHIPPED | OUTPATIENT
Start: 2025-05-14

## 2025-05-14 NOTE — TELEPHONE ENCOUNTER
Received a refill request for Singulair 10 mg- 1 tablet by mouth daily.    Last office visit was 11/19/24 for allergies.    Refill meets protocol- refilled.

## 2025-06-03 ENCOUNTER — TELEPHONE (OUTPATIENT)
Dept: ALLERGY | Facility: CLINIC | Age: 50
End: 2025-06-03

## 2025-06-03 ENCOUNTER — NURSE ONLY (OUTPATIENT)
Dept: ALLERGY | Facility: CLINIC | Age: 50
End: 2025-06-03

## 2025-06-03 DIAGNOSIS — J30.89 ENVIRONMENTAL AND SEASONAL ALLERGIES: Primary | ICD-10-CM

## 2025-06-03 PROCEDURE — 95117 IMMUNOTHERAPY INJECTIONS: CPT | Performed by: ALLERGY & IMMUNOLOGY

## 2025-06-03 NOTE — TELEPHONE ENCOUNTER
RN left message for patient with Dr. Lynch's recommendations listed below.  Provided call back number and office hours if she has any additional questions or concerns.

## 2025-06-03 NOTE — TELEPHONE ENCOUNTER
Call reviewed and noted.  Agree with triage advice provided including increased antihistamine up to twice a day including Xyzal.  Pataday eyedrops 1 drop per eye once a day.  Cool compresses to the eyes twice a day.  Store eyedrops in the refrigerator for cooling effect

## 2025-06-03 NOTE — TELEPHONE ENCOUNTER
Pt in office for AIT.   Pt reports the last two weeks her allergies have been bothering her eyes. They itch, and water all day long.     Pt gets treated for dust mite, cat and dog. Reached top dose six months ago.   Blood work in 2023 was positive for the above.   Scratch testing in 2023 showed dust mite. ID's deferred.     RN discussed no pollen allergies were found with previous testing in 2023. RN recommended increasing antihistamine to BID, and starting an allergy eye drop like Pataday.     RN also discussed environmental controls, especially humidity in relation to dust mites. Pt to check the humidity reading her home. Pt agreeable to trying medication. RN advised we could always re-test. It is possible that her allergies have changed since she was tested in 2023.     Pt agreeable to plan of care.

## 2025-07-01 ENCOUNTER — TELEPHONE (OUTPATIENT)
Dept: ALLERGY | Facility: CLINIC | Age: 50
End: 2025-07-01

## 2025-07-01 ENCOUNTER — NURSE ONLY (OUTPATIENT)
Dept: ALLERGY | Facility: CLINIC | Age: 50
End: 2025-07-01

## 2025-07-01 DIAGNOSIS — J30.89 ENVIRONMENTAL AND SEASONAL ALLERGIES: Primary | ICD-10-CM

## 2025-07-01 PROCEDURE — 95117 IMMUNOTHERAPY INJECTIONS: CPT | Performed by: ALLERGY & IMMUNOLOGY

## 2025-07-01 NOTE — TELEPHONE ENCOUNTER
Pt reports for AIT.   She reports her eyes are watering and itching.   The allergy eye drops used to work. She reports they are not working as well anymore. She reports she has tried many different brands.     She takes Levocetirizine once to twice a day.   Pt did try BID dosing last week. She reports it didn't make much difference in her symptoms.    Pt on top dose for AIT at once a month for cat, dog and dust mite.     Dr. Lynch do you have any additional recommendations?

## 2025-07-01 NOTE — TELEPHONE ENCOUNTER
Call noted.  Unfortunately of not responding to oral antihistamines including Xyzal twice a day and antihistamine eyedrops she may consider seeing her ophthalmologist.  They may consider the use of steroid eyedrops.  Only the ophthalmologist should prescribe steroid eyedrops as the need to rule out infections cataracts and glaucoma prior to using steroids  May add cool compresses twice a day as well

## 2025-07-01 NOTE — TELEPHONE ENCOUNTER
RN left voicemail for patient with Dr. Lynch's advice/recommendations listed below  Sent Mychart message with advice/recommendations  Left call back number if patient had further advice/recommendations   Please allow patient to read message

## 2025-07-02 NOTE — TELEPHONE ENCOUNTER
Dr. Lynch can you recommend any ophthalmologists?    Patient may need steroid eye drops and wishes to be further evaluated by an ophthalmologist due to continued eye symptoms even with oral and eye drop antihistamines

## 2025-07-02 NOTE — TELEPHONE ENCOUNTER
May consider [facility] or potentially asking her PCP as well as they may have an established relationship with an ophthalmologist.  Unfortunately we no longer have ophthalmology in the [facility]

## 2025-07-02 NOTE — TELEPHONE ENCOUNTER
RN sent Dr. Lynch's advice/recommendations via [employer]  Please allow patient to see message

## 2025-07-11 NOTE — TELEPHONE ENCOUNTER
RN left voicemail for patient with Dr. Lynch's advice/recommendations due to Mychart message had not been read  Left office hours and call back number if patient had further questions/concerns   Closing encounter

## 2025-08-05 RX ORDER — MONTELUKAST SODIUM 10 MG/1
10 TABLET ORAL NIGHTLY
Qty: 90 TABLET | Refills: 0 | Status: SHIPPED | OUTPATIENT
Start: 2025-08-05

## 2025-08-05 RX ORDER — LEVOCETIRIZINE DIHYDROCHLORIDE 5 MG/1
5 TABLET, FILM COATED ORAL EVERY EVENING
Qty: 30 TABLET | Refills: 2 | Status: SHIPPED | OUTPATIENT
Start: 2025-08-05

## (undated) NOTE — MR AVS SNAPSHOT
After Visit Summary   1/7/2022    Tamara Torres   MRN: [id number]           Visit Information     Date & Time  1/7/2022  2:20 PM Provider  David Guerrero MD Department  [address] [facility], [address] [place] [address] Dept.  Phone  [phone]      Y 1/24/2022 1:40 PM [place], [address], [place]      Follow-up Instructions    Return in about 6 months (around 7/7/2022).      Imaging Scheduling Instructions     Around January 7, 2022   Imaging:   [facility] ALHAJI 2D+3D SCREENING BILA experience and are looking for ways to make improvements. Your feedback will help us do so. For more information on [employer], please visit [email or web address]         No text in SmartText       No text in SmartText

## (undated) NOTE — LETTER
1/24/2025          To Whom It May Concern:    Kajal Benedict is currently under my medical care.     Due to her medical history of right foot fracture and venous disease I would not recommend that she stand for prolonged periods of time.  Please accommodate her at work so that she can stand 30 minutes and sit for 30 minutes.     If you require additional information please contact our office.        Sincerely,      Mis Munoz MD          Document generated by:  Mis Munoz MD

## (undated) NOTE — LETTER
02/07/22        Jorge Martin  [address]  [address]  [place] registros indican que tiene análisis clínicos pendientes y/o pruebas que se ordenaron en wheeler nombre que no se calix completado

## (undated) NOTE — LETTER
12/3/2024              Kajal Benedict        [address]        [place] [address]     To whom it may concern    Patient has a history of underlying dust mite allergy and is under my care for allergy treatment.  Please allow patient to refrain from working in saray environments  at wo including areas that have caused allergy symptoms in the past rk if at all possible.      Sincerely,    Hector Lynch MD          Document electronically generated by:  Hector Lynch MD

## (undated) NOTE — LETTER
2023      No Recipients     Patient: Oj Bhagat   YOB: 1975   Date of Visit: 2023       Dear Dr. Jayda Simpson Recipients: Thank you for referring Oj Bhagat to me for evaluation. Here is my assessment and plan of care:    Oj Bhagat is a 52year old female. HPI:     HPI    Pt here for an office visit. Pt's last eye exam was 4+ years ago (pt does not remember). Pt complains of tearing and itching in both eyes for about 1 year. Pt saw Dr. Birgit Reed for allergies on 23 and was prescribed Azelastine BID in both eyes. Pt used the drops for 1 week and stopped because they were very drying and caused more itching. Pt has used several other allergy drops and artificial tears including Alaway and Clear Eyes but pt has not found any relief from them. Pt is currently using Tobramycin martín once at night and pt has found some relief. Pt complains of blurry distance and near vision. Pt thinks it is due to excessive tearing. Pt states that 2 weeks ago she had a subconjunctival hemorrhage in the left eye. Pt used artificial tears TID and symptoms resolved. Pt has had LASIK done in both eyes in  in [email or web address] Consult: per Dr. Jose Landis  Last edited by Natalya Madsen, O.T. on 2023  2:19 PM.        Patient History:  Past Medical History:   Diagnosis Date    Essential hypertension        Surgical History: Oj Bhagat has a past surgical history that includes  (); other surgical history () (endometrium); other surgical history () (breast augmentation); other surgical history () (nose); other surgical history (, ) (ovary sx); and LASIK (Bilateral, ) (done in [facility] [[email or web address]     Family History   Problem Relation Age of Onset    Hypertension Mother     Hypertension Maternal Grandfather     Macular degeneration Neg     Diabetes Neg     Glaucoma Neg        Social History:   Social History     Socioeconomic History Marital status: Single   Tobacco Use    Smoking status: Former     Types: Cigarettes     Quit date: 2006     Years since quittin.6     Passive exposure: Never    Smokeless tobacco: Never   Vaping Use    Vaping Use: Never used   Substance and Sexual Activity    Alcohol use: Yes    Drug use: Never   Other Topics Concern    Reaction to local anesthetic No    Pt has a pacemaker No    Pt has a defibrillator No       Medications:  Current Outpatient Medications   Medication Sig Dispense Refill    Azelastine HCl 0.05 % Ophthalmic Solution       albuterol 108 (90 Base) MCG/ACT Inhalation Aero Soln Inhale 2 puffs into the lungs every 6 (six) hours as needed for Wheezing. 1 each 3    montelukast (SINGULAIR) 10 MG Oral Tab Take 1 tablet (10 mg total) by mouth nightly. 90 tablet 1    amLODIPine 5 MG Oral Tab Take 1 tablet (5 mg total) by mouth daily. 90 tablet 3    levocetirizine 5 MG Oral Tab Take 1 tablet (5 mg total) by mouth every evening.  (Patient not taking: Reported on 2023) 90 tablet 1       Allergies:    Blood-Group Specifi*    FACE FLUSHING, SHORTNESS OF BREATH,                           TONGUE SWELLING  Dust                    Coughing, ITCHING, WHEEZING    ROS:     ROS    Positive for: Eyes  Negative for: Constitutional, Gastrointestinal, Neurological, Skin, Genitourinary, Musculoskeletal, HENT, Endocrine, Cardiovascular, Respiratory, Psychiatric, Allergic/Imm, Heme/Lymph  Last edited by Negrita Jeffries OSTEFANO on 2023  1:55 PM.          PHYSICAL EXAM:     Base Eye Exam       Visual Acuity (Snellen - Linear)         Right Left    Dist sc 20/20 -2 20/20    Near sc 20/40 20/40-              Pupils         Pupils    Right PERRL    Left PERRL              Visual Fields         Left Right     Full Full              Extraocular Movement         Right Left     Full, Ortho Full, Ortho                  Slit Lamp and Fundus Exam       Slit Lamp Exam         Right Left    Lids/Lashes increased tear meniscus, normal puncta increased tear meniscus, normal puncta    Conjunctiva/Sclera non-injection, 1+ Papilla non-injection, 1+ Papilla    Cornea lasik scar inferior, Clear, no fluorescein stain lasik scar temp and inferior, Clear, no fluorescein stain    Anterior Chamber Deep and quiet Deep and quiet    Iris Normal Normal              Fundus Exam         Right Left    Disc Good rim Good rim    C/D Ratio 0.35 0.35                  Lacrimal Exam       Schirmers         Right Left     22 mm 22 mm      Anesthesia: Yes                  Refraction       Wearing Rx       Type: No glasses              Manifest Refraction    Recommend +1.50 over the counter reading glasses per RJM                    ASSESSMENT/PLAN:     Diagnoses and Plan:     Bilateral epiphora  Diagnosis discussed with patient. Advised patient to stop Tobramycin. Recommend Lacri-lube ointment OU. Advised patient to see Dr. Sydnie Smith or Dr. Gustavo Hernandez at [facility] to evaluate and treat. Information given to patient. Will see patient as needed    No orders of the defined types were placed in this encounter. Meds This Visit:  Requested Prescriptions      No prescriptions requested or ordered in this encounter        Follow up instructions:  Return if symptoms worsen or fail to improve. 8/8/2023  Scribed by: Diane Lozano MD        If you have questions, please do not hesitate to call me. I look forward to following Sujeyleif Vo along with you.     Sincerely,        Diane Lozano MD        CC:   No Recipients    Document electronically generated by: Diane Lozano MD